# Patient Record
Sex: FEMALE | Race: WHITE | NOT HISPANIC OR LATINO
[De-identification: names, ages, dates, MRNs, and addresses within clinical notes are randomized per-mention and may not be internally consistent; named-entity substitution may affect disease eponyms.]

---

## 2020-06-23 PROBLEM — Z00.00 ENCOUNTER FOR PREVENTIVE HEALTH EXAMINATION: Status: ACTIVE | Noted: 2020-06-23

## 2020-06-29 ENCOUNTER — APPOINTMENT (OUTPATIENT)
Dept: OTOLARYNGOLOGY | Facility: CLINIC | Age: 55
End: 2020-06-29
Payer: COMMERCIAL

## 2020-06-29 VITALS
OXYGEN SATURATION: 98 % | TEMPERATURE: 98.5 F | DIASTOLIC BLOOD PRESSURE: 82 MMHG | SYSTOLIC BLOOD PRESSURE: 124 MMHG | RESPIRATION RATE: 14 BRPM | HEIGHT: 64 IN | WEIGHT: 165 LBS | HEART RATE: 92 BPM | BODY MASS INDEX: 28.17 KG/M2

## 2020-06-29 DIAGNOSIS — Z72.89 OTHER PROBLEMS RELATED TO LIFESTYLE: ICD-10-CM

## 2020-06-29 DIAGNOSIS — H93.8X2 OTHER SPECIFIED DISORDERS OF LEFT EAR: ICD-10-CM

## 2020-06-29 DIAGNOSIS — Z78.9 OTHER SPECIFIED HEALTH STATUS: ICD-10-CM

## 2020-06-29 DIAGNOSIS — Z86.39 PERSONAL HISTORY OF OTHER ENDOCRINE, NUTRITIONAL AND METABOLIC DISEASE: ICD-10-CM

## 2020-06-29 DIAGNOSIS — Z80.9 FAMILY HISTORY OF MALIGNANT NEOPLASM, UNSPECIFIED: ICD-10-CM

## 2020-06-29 DIAGNOSIS — Z82.0 FAMILY HISTORY OF EPILEPSY AND OTHER DISEASES OF THE NERVOUS SYSTEM: ICD-10-CM

## 2020-06-29 DIAGNOSIS — R26.89 OTHER ABNORMALITIES OF GAIT AND MOBILITY: ICD-10-CM

## 2020-06-29 DIAGNOSIS — Z82.49 FAMILY HISTORY OF ISCHEMIC HEART DISEASE AND OTHER DISEASES OF THE CIRCULATORY SYSTEM: ICD-10-CM

## 2020-06-29 DIAGNOSIS — Z81.8 FAMILY HISTORY OF OTHER MENTAL AND BEHAVIORAL DISORDERS: ICD-10-CM

## 2020-06-29 PROCEDURE — 95992 CANALITH REPOSITIONING PROC: CPT | Mod: RT

## 2020-06-29 PROCEDURE — 69801 INCISE INNER EAR: CPT | Mod: LT

## 2020-06-29 PROCEDURE — 92550 TYMPANOMETRY & REFLEX THRESH: CPT

## 2020-06-29 PROCEDURE — 99204 OFFICE O/P NEW MOD 45 MIN: CPT | Mod: 25

## 2020-06-29 PROCEDURE — 92557 COMPREHENSIVE HEARING TEST: CPT

## 2020-06-29 RX ORDER — ATORVASTATIN CALCIUM 10 MG/1
10 TABLET, FILM COATED ORAL
Refills: 0 | Status: ACTIVE | COMMUNITY

## 2020-06-29 NOTE — HISTORY OF PRESENT ILLNESS
[de-identified] : 55F w PMH of CAT HOOKS s/p endolymphatic shunt in , who presents with 3 weeks of vertigo and bilateral hearing loss and tinnitus. She reports she noticed sudden onset of right hearing loss and vertigo with tinnitus which she reached out to her PCP for diuretics which she started (chlorthalidone, 3 weeks ago). She explains that in 1 week her r hearing improved, but continues to have vertigo and fullness in L ear. She denies otorrhea, otalgia. No other ENT complaints. No pertinent Fh/SH.She is sure her l hearing is decreased over her baseline. We called [facility] for old  and they told us did not have it. Her vertgigo is severe and positional lasting seconds at a time. no inciting event. nonsmoker. grinds her teeth.

## 2020-06-29 NOTE — PROCEDURE
[Risk and Benefits Discussed] : The purpose, risks, discomforts, benefits and alternatives of the procedure have been explained to the patient including no treatment. [Same] : same as the Pre Op Dx. [] : Epley Maneuver [FreeTextEntry2] : left ear [FreeTextEntry1] : see subjective [FreeTextEntry4] : topical phenol [FreeTextEntry6] : 0.3cc dexamethasone injected AS IT under microscope\par r modified epley maneuver, nystagmus  resolved on  3rd iteration

## 2020-06-29 NOTE — ASSESSMENT
[FreeTextEntry1] : 55F w L MD s/p EL shunt in 2009 who presents with vertigo, [place] Hallpike + on the right, audiogram shows normal R hearing and moderate L hearing loss with patient c/o new onset decreased hearing of the left and fullness. Patient now s/p L intraTM injection and epley maneuver. likely reactivation of MD as she is sure l hearing is decreased\par \par \par Plan:\par - f/u 1 week\par - ofloxacin gtts\par - ear precauitions\par - elevate HOB 48hrs\par risks benefits and alts to po and it steroids discussed\par no htn dm pud infx butr has stress\par - Rx Medrol dosepak (he req)\par - Rx Omeprazole x 1 week\par - MRI IAC\par it dex injex given AS

## 2020-06-29 NOTE — REVIEW OF SYSTEMS
[As Noted in HPI] : as noted in HPI [Patient Intake Form Reviewed] : Patient intake form was reviewed [Negative] : Endocrine [FreeTextEntry1] : all other ROS negative

## 2020-06-29 NOTE — PHYSICAL EXAM
[] : [place]-Hallpike test is positive [Normal] : mucosa is normal [de-identified] : gait wide based [de-identified] : right side positive mildly

## 2020-06-29 NOTE — DATA REVIEWED
[de-identified] : audiogram- left moderate mhl cw otosclerossi, R normal hearing. she is sure l hearing is decreased over baseline.

## 2020-07-06 ENCOUNTER — APPOINTMENT (OUTPATIENT)
Dept: OTOLARYNGOLOGY | Facility: CLINIC | Age: 55
End: 2020-07-06
Payer: COMMERCIAL

## 2020-07-06 VITALS
SYSTOLIC BLOOD PRESSURE: 134 MMHG | TEMPERATURE: 98.6 F | DIASTOLIC BLOOD PRESSURE: 86 MMHG | OXYGEN SATURATION: 95 % | HEART RATE: 87 BPM

## 2020-07-06 DIAGNOSIS — H91.22 SUDDEN IDIOPATHIC HEARING LOSS, LEFT EAR: ICD-10-CM

## 2020-07-06 PROCEDURE — 95992 CANALITH REPOSITIONING PROC: CPT | Mod: RT

## 2020-07-06 PROCEDURE — 92557 COMPREHENSIVE HEARING TEST: CPT

## 2020-07-06 PROCEDURE — 99213 OFFICE O/P EST LOW 20 MIN: CPT | Mod: 25

## 2020-07-06 NOTE — HISTORY OF PRESENT ILLNESS
[de-identified] : 55F w/ PMH of CAT HOOKS s/p endolymphatic shunt , being seen for vertigo and hearing loss/tinnitus. She was found to have + [place] Hallpike on the right, eply performed with resolution of nystagmus. Today patient reports continued vertigo and hearing loss. She also had IT dex on l for suddenhl which has improved. she feels it is back to baseline. Tatyana cintron provide us with her old  No new complaints. No pertinent FH/SH.

## 2020-07-06 NOTE — REVIEW OF SYSTEMS
[As Noted in HPI] : as noted in HPI [Negative] : Heme/Lymph [FreeTextEntry1] : all other ROS negative

## 2020-07-06 NOTE — ASSESSMENT
[FreeTextEntry1] : 55F who returns for vertigo and hearing loss- h/o MD. Patient is declining another intratympanic injection at this time as she feels her ear is improved. Eplye performed again due to + nay hallpike with resolution upon repeat nay hallpike.\par \par Plan:\par - f/u in 1 week\par - keep HOB elevated 48hrs\par - keep ear dry (ear precautions) until follow up\par continue low sodium diet and diuretic\par

## 2020-07-06 NOTE — PHYSICAL EXAM
[] : [place]-Hallpike test is positive [Normal] : external appearance is normal [de-identified] : L TM with small perforation from prior injection 1% [de-identified] : right weakly positive

## 2020-07-06 NOTE — PROCEDURE
[Risk and Benefits Discussed] : The purpose, risks, discomforts, benefits and alternatives of the procedure have been explained to the patient including no treatment. [Same] : same as the Pre Op Dx. [] : Epley Maneuver [FreeTextEntry4] : n [FreeTextEntry1] : see subjective [FreeTextEntry6] : patient was found to have positive right nay hallpike, epley maneuvar was then performed with resolution of nystagmus upon repeat nay hallpike

## 2020-07-13 ENCOUNTER — APPOINTMENT (OUTPATIENT)
Dept: OTOLARYNGOLOGY | Facility: CLINIC | Age: 55
End: 2020-07-13
Payer: COMMERCIAL

## 2020-07-13 DIAGNOSIS — H81.02 MENIERE'S DISEASE, LEFT EAR: ICD-10-CM

## 2020-07-13 DIAGNOSIS — H81.11 BENIGN PAROXYSMAL VERTIGO, RIGHT EAR: ICD-10-CM

## 2020-07-13 DIAGNOSIS — H90.42 SENSORINEURAL HEARING LOSS, UNILATERAL, LEFT EAR, WITH UNRESTRICTED HEARING ON THE CONTRALATERAL SIDE: ICD-10-CM

## 2020-07-13 PROCEDURE — 92557 COMPREHENSIVE HEARING TEST: CPT

## 2020-07-13 PROCEDURE — 99214 OFFICE O/P EST MOD 30 MIN: CPT | Mod: 25

## 2020-07-13 PROCEDURE — 95992 CANALITH REPOSITIONING PROC: CPT | Mod: RT

## 2020-07-13 NOTE — HISTORY OF PRESENT ILLNESS
[de-identified] : wrong note for this day - pls see other note labeled 7/13/20 [FreeTextEntry1] : update 7/13/20-\par Patient returns for follow up with unchanged audiogram showing L SNHL. She reports resolved vertigo with position change but occasional disequilibrium. She denies any change in left ear hearing, tinnitus, otalgia or otorrhea.

## 2020-07-13 NOTE — PHYSICAL EXAM
[Normal] : assessment of respiratory effort is normal [de-identified] : gait steady  [de-identified] : L TM healed from prior intraTM injection [] : [place]-Hallpike test is negative [de-identified] : patient reported still some vertigo with the nay hallpike on the right, Eply performed with improvement

## 2020-07-13 NOTE — PROCEDURE
[Risk and Benefits Discussed] : The purpose, risks, discomforts, benefits and alternatives of the procedure have been explained to the patient including no treatment. [Same] : same as the Pre Op Dx. [] : Epley Maneuver [FreeTextEntry1] : see subjective [FreeTextEntry6] : with improvement of vertigo with R nay hallpike

## 2020-07-13 NOTE — PHYSICAL EXAM
[Normal] : assessment of respiratory effort is normal [de-identified] : gait steady  [de-identified] : L TM healed from prior intraTM injection [] : [place]-Hallpike test is negative [de-identified] : patient reported still some vertigo with the nay hallpike on the right, Eply performed with improvement

## 2020-07-13 NOTE — ASSESSMENT
[FreeTextEntry1] : 55F who returns for follow up of L sudden hearing loss and R BPPV, audiogram shows stable L asymmetric HL and Eply performed for continued vertigo (though no nystagmus) of the right. \par \par Plan:\par - f/u in 3 months or sooner if new or worsened symptoms\par - elevate HOB x 2 days\par

## 2020-07-13 NOTE — HISTORY OF PRESENT ILLNESS
[de-identified] : wrong note for this day - pls see other note labeled 7/13/20 [FreeTextEntry1] : update 7/13/20-\par Patient returns for follow up with unchanged audiogram showing L SNHL. She reports resolved vertigo with position change but occasional disequilibrium. She denies any change in left ear hearing, tinnitus, otalgia or otorrhea.

## 2020-07-13 NOTE — REVIEW OF SYSTEMS
[Patient Intake Form Reviewed] : Patient intake form was reviewed [As Noted in HPI] : as noted in HPI [Negative] : Heme/Lymph [FreeTextEntry1] : all other ROS negative

## 2020-08-19 ENCOUNTER — APPOINTMENT (OUTPATIENT)
Dept: OTOLARYNGOLOGY | Facility: CLINIC | Age: 55
End: 2020-08-19
Payer: COMMERCIAL

## 2020-08-19 VITALS
HEART RATE: 89 BPM | TEMPERATURE: 98.3 F | DIASTOLIC BLOOD PRESSURE: 84 MMHG | SYSTOLIC BLOOD PRESSURE: 127 MMHG | OXYGEN SATURATION: 99 %

## 2020-08-19 PROCEDURE — 99214 OFFICE O/P EST MOD 30 MIN: CPT | Mod: 25

## 2020-08-19 PROCEDURE — 92557 COMPREHENSIVE HEARING TEST: CPT

## 2020-08-19 PROCEDURE — 92550 TYMPANOMETRY & REFLEX THRESH: CPT

## 2020-08-19 PROCEDURE — 69801 INCISE INNER EAR: CPT | Mod: RT

## 2020-08-19 NOTE — PROCEDURE
[Risk and Benefits Discussed] : The purpose, risks, discomforts, benefits and alternatives of the procedure have been explained to the patient including no treatment. [Sudden Hearing Loss] : Sudden Hearing Loss [[facility]HL[facility] [] : Intratympanic Therapy [Same] : same as the Pre Op Dx. [FreeTextEntry6] : 0,.3 cc dex injected AD IT under microscope [FreeTextEntry4] : phenol

## 2020-08-19 NOTE — HISTORY OF PRESENT ILLNESS
[de-identified] : followup 54 yo woman with left meniere's disease has noticed 2 d of r aural fullness hl and tinnitus; she called with this yesterday pm and I advised her to come in. She denies vertigo. No inciting event. Feels a lot of pressure in the ear. -f.s.c Feels the hl is moderate in severity.

## 2020-08-24 ENCOUNTER — APPOINTMENT (OUTPATIENT)
Dept: OTOLARYNGOLOGY | Facility: CLINIC | Age: 55
End: 2020-08-24
Payer: COMMERCIAL

## 2020-08-24 VITALS
DIASTOLIC BLOOD PRESSURE: 77 MMHG | SYSTOLIC BLOOD PRESSURE: 112 MMHG | TEMPERATURE: 98.6 F | HEART RATE: 96 BPM | OXYGEN SATURATION: 95 %

## 2020-08-24 PROCEDURE — 92550 TYMPANOMETRY & REFLEX THRESH: CPT

## 2020-08-24 PROCEDURE — 92557 COMPREHENSIVE HEARING TEST: CPT

## 2020-08-24 PROCEDURE — 99214 OFFICE O/P EST MOD 30 MIN: CPT

## 2020-09-14 ENCOUNTER — APPOINTMENT (OUTPATIENT)
Dept: OTOLARYNGOLOGY | Facility: CLINIC | Age: 55
End: 2020-09-14
Payer: COMMERCIAL

## 2020-09-14 VITALS
SYSTOLIC BLOOD PRESSURE: 103 MMHG | HEART RATE: 87 BPM | TEMPERATURE: 98.3 F | OXYGEN SATURATION: 95 % | DIASTOLIC BLOOD PRESSURE: 74 MMHG

## 2020-09-14 PROCEDURE — 92557 COMPREHENSIVE HEARING TEST: CPT

## 2020-09-14 PROCEDURE — 99214 OFFICE O/P EST MOD 30 MIN: CPT

## 2020-09-14 NOTE — REASON FOR VISIT
[Subsequent Evaluation] : a subsequent evaluation for [FreeTextEntry2] : b meniere's disease and sudden hl

## 2020-09-14 NOTE — REASON FOR VISIT
[Subsequent Evaluation] : a subsequent evaluation for [FreeTextEntry2] : followup 56 yo woman with b meniere's and sudden r hl

## 2020-09-14 NOTE — HISTORY OF PRESENT ILLNESS
[de-identified] : followup 56 yo woman with b md and r sudden hearing loss -she reports that r ear feels back to normal. Has had no attacks on 50 mg chlorthalidone and lo sodium diet but feels lightheaded and has been found to have lower bp. Denies vertigo. Hl had been significant.

## 2020-09-14 NOTE — HISTORY OF PRESENT ILLNESS
[de-identified] : 1 wk followup 54 yo woman with r meniere's disease and sudden hl had po steroids, IT dex injx and increased diuretic to chlorthalidone 50 mg.day feels much better. fullness gone and feels her hearing has improved a lot, possibly to baseline. denies pain or drainage. Denies vertigo.

## 2020-09-15 LAB
ANION GAP SERPL CALC-SCNC: 16 MMOL/L
BUN SERPL-MCNC: 12 MG/DL
CALCIUM SERPL-MCNC: 9.6 MG/DL
CHLORIDE SERPL-SCNC: 89 MMOL/L
CO2 SERPL-SCNC: 31 MMOL/L
CREAT SERPL-MCNC: 0.74 MG/DL
GLUCOSE SERPL-MCNC: 119 MG/DL
POTASSIUM SERPL-SCNC: 3.2 MMOL/L
SODIUM SERPL-SCNC: 136 MMOL/L

## 2020-10-01 ENCOUNTER — APPOINTMENT (OUTPATIENT)
Dept: OTOLARYNGOLOGY | Facility: CLINIC | Age: 55
End: 2020-10-01
Payer: COMMERCIAL

## 2020-10-01 VITALS
SYSTOLIC BLOOD PRESSURE: 121 MMHG | OXYGEN SATURATION: 97 % | HEART RATE: 83 BPM | DIASTOLIC BLOOD PRESSURE: 86 MMHG | TEMPERATURE: 98.7 F

## 2020-10-01 PROCEDURE — 99214 OFFICE O/P EST MOD 30 MIN: CPT | Mod: 25

## 2020-10-01 PROCEDURE — 92557 COMPREHENSIVE HEARING TEST: CPT

## 2020-10-01 PROCEDURE — 69801 INCISE INNER EAR: CPT | Mod: RT

## 2020-10-01 NOTE — PROCEDURE
[Risk and Benefits Discussed] : The purpose, risks, discomforts, benefits and alternatives of the procedure have been explained to the patient including no treatment. [[facility] [Sudden Hearing Loss] : Sudden Hearing Loss [Same] : same as the Pre Op Dx. [] : Intratympanic Therapy [FreeTextEntry4] : phenol [FreeTextEntry6] : 0.3 cc dex injected AD IT under microscope

## 2020-10-01 NOTE — DATA REVIEWED
[de-identified] : r increased lf hl - looks conductive on  but audiologist confirms it was vibrotactile sensation which caused this appearance,

## 2020-10-01 NOTE — HISTORY OF PRESENT ILLNESS
[de-identified] : followup 54 yo woman with b meniere's disease and s/p els on left many years ago - she has longstanding h/o l severe snhl and is on lo sodium diet and chlorthalidone - due to low bp the med was decreased from 50 mg/day to 37.5 and her r hearing dropped again. She increased back to 50 mg/day and her hearing improved - here to check. Denies vertigo. She still feels as if her right hearing is decreased from baseline feels the loss is moderate in severity.

## 2020-10-08 ENCOUNTER — RX RENEWAL (OUTPATIENT)
Age: 55
End: 2020-10-08

## 2020-10-08 ENCOUNTER — APPOINTMENT (OUTPATIENT)
Dept: OTOLARYNGOLOGY | Facility: CLINIC | Age: 55
End: 2020-10-08
Payer: COMMERCIAL

## 2020-10-08 VITALS
DIASTOLIC BLOOD PRESSURE: 83 MMHG | OXYGEN SATURATION: 100 % | SYSTOLIC BLOOD PRESSURE: 122 MMHG | HEART RATE: 97 BPM | TEMPERATURE: 98.3 F

## 2020-10-08 PROCEDURE — 99213 OFFICE O/P EST LOW 20 MIN: CPT

## 2020-10-08 PROCEDURE — 92557 COMPREHENSIVE HEARING TEST: CPT

## 2020-10-08 NOTE — REASON FOR VISIT
[Subsequent Evaluation] : a subsequent evaluation for [FreeTextEntry2] : B/L MD with new onset R sudden HL

## 2020-10-08 NOTE — ASSESSMENT
[FreeTextEntry1] : 55F w PMH of MD, following up after R sudden HL s/p IT steroid inj with improved R hearing on audiogram. No further dizziness.\par \par Plan:\par - patient declined another IT injection as she feels her hearing is good enough now\par - continue diuretic and low sodium diet\par - f/u in 6 weeks, or sooner if return of symptoms\par - discussed option of regular IT dex injx's - she is considering

## 2020-10-08 NOTE — HISTORY OF PRESENT ILLNESS
[de-identified] : 55F w PMH of ponce/adrian HOOKS, who returns after a R sudden HL s/p R IT steroid inj and meedrol doseapack. Today's audiogram shows improved hearing from last appointment, but not absolutely back to baseline hearing. She also reports continued tinnitus. She denies any dizziness or  other ENT complaints.

## 2020-10-28 ENCOUNTER — TRANSCRIPTION ENCOUNTER (OUTPATIENT)
Age: 55
End: 2020-10-28

## 2020-11-05 ENCOUNTER — APPOINTMENT (OUTPATIENT)
Dept: OTOLARYNGOLOGY | Facility: CLINIC | Age: 55
End: 2020-11-05
Payer: COMMERCIAL

## 2020-11-05 VITALS
RESPIRATION RATE: 15 BRPM | OXYGEN SATURATION: 95 % | HEART RATE: 91 BPM | SYSTOLIC BLOOD PRESSURE: 111 MMHG | TEMPERATURE: 99 F | DIASTOLIC BLOOD PRESSURE: 77 MMHG

## 2020-11-05 PROCEDURE — 99213 OFFICE O/P EST LOW 20 MIN: CPT | Mod: 25

## 2020-11-05 PROCEDURE — 99072 ADDL SUPL MATRL&STAF TM PHE: CPT

## 2020-11-05 PROCEDURE — 92550 TYMPANOMETRY & REFLEX THRESH: CPT

## 2020-11-05 PROCEDURE — 92557 COMPREHENSIVE HEARING TEST: CPT

## 2020-11-05 PROCEDURE — 69801 INCISE INNER EAR: CPT | Mod: RT

## 2020-11-05 NOTE — HISTORY OF PRESENT ILLNESS
[de-identified] : followup 56 yo woman with h/o b meniere's l acitve over 10 yrs ago and had els, has longstanding snhl; on r, she has had fluctuating hearing - had done well after IT dexamethasone injx 5 weeks ago. She had called yesterday- had 2 d h/o r aural fullness hl and mild dizziness. Her r hearing had significant impairment and it was slightly better today but still decreased. Has been following lo sodium diet and diuretic. nonsmoker. hydrating well.

## 2020-11-05 NOTE — REASON FOR VISIT
[Subsequent Evaluation] : a subsequent evaluation for [FreeTextEntry2] : b maurice's and sudden r hl

## 2020-11-05 NOTE — PROCEDURE
[Risk and Benefits Discussed] : The purpose, risks, discomforts, benefits and alternatives of the procedure have been explained to the patient including no treatment. [facility] [Sudden Hearing Loss] : Sudden Hearing Loss [Same] : same as the Pre Op Dx. [] : Intratympanic Therapy [FreeTextEntry4] : phenol [FreeTextEntry6] : 0.3 cc dexamethasone injected AD IT under microscope

## 2020-11-12 ENCOUNTER — APPOINTMENT (OUTPATIENT)
Dept: OTOLARYNGOLOGY | Facility: CLINIC | Age: 55
End: 2020-11-12
Payer: COMMERCIAL

## 2020-11-12 VITALS
SYSTOLIC BLOOD PRESSURE: 124 MMHG | TEMPERATURE: 98.3 F | DIASTOLIC BLOOD PRESSURE: 84 MMHG | HEART RATE: 98 BPM | OXYGEN SATURATION: 98 %

## 2020-11-12 PROCEDURE — 92557 COMPREHENSIVE HEARING TEST: CPT

## 2020-11-12 PROCEDURE — 99072 ADDL SUPL MATRL&STAF TM PHE: CPT

## 2020-11-12 PROCEDURE — 99214 OFFICE O/P EST MOD 30 MIN: CPT

## 2020-11-12 NOTE — REASON FOR VISIT
[Subsequent Evaluation] : a subsequent evaluation for [FreeTextEntry2] : sudden bilateral hearing loss

## 2020-11-12 NOTE — REVIEW OF SYSTEMS
[As Noted in HPI] : as noted in HPI [Patient Intake Form Reviewed] : Patient intake form was reviewed [Negative] : Heme/Lymph [FreeTextEntry1] : all other ROS negative

## 2020-11-12 NOTE — DATA REVIEWED
[de-identified] : audio- worsening of bilateral SNHL in th elow frequencies - reviewed with pt [de-identified] : potassium- 3.0 (down from 3.2)

## 2020-11-12 NOTE — ASSESSMENT
[FreeTextEntry1] : 55F w PMH if B/L MD, who returns for follow up of sudden R>L hearing loss. Audiogram shows mild worsening of both ear SNHL.\par \par Plan:\par - recommended bilateral IT steroid inj- patient declined at this time\par - recommend Medrol dosepak\par - Rx Omeprazole- for GI PPX\par - potassium chloride- switch to 40mg QD\par - f/u in 1 week\par - referral to Dr. Keke Quintanilla for possible rituxumab \par

## 2020-11-12 NOTE — PHYSICAL EXAM
[Normal] : no rashes [de-identified] : healed R TM after IT steroid injection [de-identified] : gait steady

## 2020-11-13 ENCOUNTER — TRANSCRIPTION ENCOUNTER (OUTPATIENT)
Age: 55
End: 2020-11-13

## 2020-11-18 ENCOUNTER — NON-APPOINTMENT (OUTPATIENT)
Age: 55
End: 2020-11-18

## 2020-11-18 ENCOUNTER — APPOINTMENT (OUTPATIENT)
Dept: PHARMACY | Facility: CLINIC | Age: 55
End: 2020-11-18
Payer: COMMERCIAL

## 2020-11-18 ENCOUNTER — APPOINTMENT (OUTPATIENT)
Dept: OTOLARYNGOLOGY | Facility: CLINIC | Age: 55
End: 2020-11-18
Payer: COMMERCIAL

## 2020-11-18 VITALS
HEART RATE: 94 BPM | OXYGEN SATURATION: 97 % | DIASTOLIC BLOOD PRESSURE: 83 MMHG | TEMPERATURE: 97.5 F | SYSTOLIC BLOOD PRESSURE: 133 MMHG

## 2020-11-18 PROCEDURE — 92557 COMPREHENSIVE HEARING TEST: CPT

## 2020-11-18 PROCEDURE — 99213 OFFICE O/P EST LOW 20 MIN: CPT

## 2020-11-18 PROCEDURE — V5299A: CUSTOM | Mod: NC

## 2020-11-18 NOTE — HISTORY OF PRESENT ILLNESS
[de-identified] : followup 54 yo woman with h/o b meniere's disease which is starting to appear as aied. She is on low sodium diet and chlorthalidone and had po steroids and feels her hearing has improved. She has appt to see Dr Quintanilla after me and is here to check her ears. No vertigo at present.  She feels her l hearing loss is severe and r mild. Getting a l HA today. She feels her hearing decreased today with some stress.

## 2020-12-03 NOTE — HISTORY OF PRESENT ILLNESS
[de-identified] : 1 week followup 55F w PMH of B/L MD L>R, who returns with 2 days of sudden bilateral R>L hearing loss. She admits to worsened left tinnitus, but denies any otalgia, otorrhea, vertigo. She ate sodium inadvertently in the beginning of this week and experienced a lot of stress with the election. She denies vertigo. Had IT dex injx AD last week. Is on chlorthalidone and potassium. Had repeat level checked. She denies any other ENT complaints. 
patient declined

## 2020-12-04 ENCOUNTER — APPOINTMENT (OUTPATIENT)
Dept: OTOLARYNGOLOGY | Facility: CLINIC | Age: 55
End: 2020-12-04
Payer: COMMERCIAL

## 2020-12-04 VITALS
HEART RATE: 79 BPM | DIASTOLIC BLOOD PRESSURE: 80 MMHG | SYSTOLIC BLOOD PRESSURE: 122 MMHG | OXYGEN SATURATION: 97 % | TEMPERATURE: 97.6 F

## 2020-12-04 DIAGNOSIS — H91.23 SUDDEN IDIOPATHIC HEARING LOSS, BILATERAL: ICD-10-CM

## 2020-12-04 PROCEDURE — 99072 ADDL SUPL MATRL&STAF TM PHE: CPT

## 2020-12-04 PROCEDURE — 99214 OFFICE O/P EST MOD 30 MIN: CPT | Mod: 25

## 2020-12-04 PROCEDURE — 69801 INCISE INNER EAR: CPT | Mod: 50

## 2020-12-04 PROCEDURE — 92557 COMPREHENSIVE HEARING TEST: CPT

## 2020-12-04 NOTE — HISTORY OF PRESENT ILLNESS
[de-identified] : 56 yo woman with l meniere's x > 20 yrs with els and more recently r menieres with b progressive hl initially responsive ot lo sodium diet and chlorthalidone has become less responsive with electrolyte abnormalities. She has seen Dr Quintanilla who gave her an emergency prednisone script and began the process to get her rituximab. She had her order declined by insurance so she is appealing it, per Ms. Nick. She called this pm and said her hearing has been decreased in b ears for the past week. Denies vertigo. She feels the loss is severe AS and less AD.

## 2020-12-04 NOTE — PROCEDURE
[Risk and Benefits Discussed] : The purpose, risks, discomforts, benefits and alternatives of the procedure have been explained to the patient including no treatment. [[facility] [[facility]den Hearing Loss] : Sudden Hearing Loss [Same] : same as the Pre Op Dx. [] : Intratympanic Therapy [FreeTextEntry4] : phenol AD [FreeTextEntry6] : 0.3 cc dex injected AU IT under microscope atraumatically 20 min apart l first then wait 20 min then r then wait another 20 min

## 2020-12-04 NOTE — DATA REVIEWED
[de-identified] : b hl r lf worsened; l mid freqs worsened - reviewd with pt [de-identified] : she brought blood work - sodium 132 k 3.1 on 40 mg kcl/day and chlorthalidone, bun/creat nl esr 32

## 2020-12-07 ENCOUNTER — NON-APPOINTMENT (OUTPATIENT)
Age: 55
End: 2020-12-07

## 2020-12-07 ENCOUNTER — TRANSCRIPTION ENCOUNTER (OUTPATIENT)
Age: 55
End: 2020-12-07

## 2020-12-10 ENCOUNTER — APPOINTMENT (OUTPATIENT)
Dept: PHARMACY | Facility: CLINIC | Age: 55
End: 2020-12-10

## 2020-12-11 ENCOUNTER — APPOINTMENT (OUTPATIENT)
Dept: OTOLARYNGOLOGY | Facility: CLINIC | Age: 55
End: 2020-12-11
Payer: COMMERCIAL

## 2020-12-11 VITALS
TEMPERATURE: 99.1 F | HEART RATE: 96 BPM | OXYGEN SATURATION: 95 % | DIASTOLIC BLOOD PRESSURE: 89 MMHG | SYSTOLIC BLOOD PRESSURE: 111 MMHG

## 2020-12-11 PROCEDURE — 99072 ADDL SUPL MATRL&STAF TM PHE: CPT

## 2020-12-11 PROCEDURE — 92557 COMPREHENSIVE HEARING TEST: CPT

## 2020-12-11 PROCEDURE — 99214 OFFICE O/P EST MOD 30 MIN: CPT

## 2020-12-11 NOTE — HISTORY OF PRESENT ILLNESS
[de-identified] : followup 54 yo woman with aied - she took medrol and feels big b improvement. Has met with Dr Quintanilla regarding biologicals. Has mild loss of balance. She feels r ear is back to normal. has has sx for mo on r. Better about 5d.

## 2020-12-13 LAB
ALBUMIN SERPL ELPH-MCNC: 5.3 G/DL
ALP BLD-CCNC: 83 U/L
ALT SERPL-CCNC: 18 U/L
ANION GAP SERPL CALC-SCNC: 18 MMOL/L
AST SERPL-CCNC: 16 U/L
BILIRUB SERPL-MCNC: 0.5 MG/DL
BUN SERPL-MCNC: 13 MG/DL
CALCIUM SERPL-MCNC: 10.5 MG/DL
CHLORIDE SERPL-SCNC: 85 MMOL/L
CO2 SERPL-SCNC: 28 MMOL/L
CREAT SERPL-MCNC: 0.98 MG/DL
GLUCOSE SERPL-MCNC: 108 MG/DL
POTASSIUM SERPL-SCNC: 3.5 MMOL/L
PROT SERPL-MCNC: 8.1 G/DL
SODIUM SERPL-SCNC: 132 MMOL/L

## 2020-12-16 ENCOUNTER — APPOINTMENT (OUTPATIENT)
Dept: OTOLARYNGOLOGY | Facility: CLINIC | Age: 55
End: 2020-12-16
Payer: COMMERCIAL

## 2020-12-16 ENCOUNTER — APPOINTMENT (OUTPATIENT)
Dept: PHARMACY | Facility: CLINIC | Age: 55
End: 2020-12-16
Payer: COMMERCIAL

## 2020-12-16 PROCEDURE — 99072 ADDL SUPL MATRL&STAF TM PHE: CPT

## 2020-12-16 PROCEDURE — V5299A: CUSTOM | Mod: NC

## 2020-12-16 PROCEDURE — 92557 COMPREHENSIVE HEARING TEST: CPT

## 2020-12-24 ENCOUNTER — NON-APPOINTMENT (OUTPATIENT)
Age: 55
End: 2020-12-24

## 2020-12-24 ENCOUNTER — TRANSCRIPTION ENCOUNTER (OUTPATIENT)
Age: 55
End: 2020-12-24

## 2021-01-06 ENCOUNTER — APPOINTMENT (OUTPATIENT)
Dept: OTOLARYNGOLOGY | Facility: CLINIC | Age: 56
End: 2021-01-06
Payer: COMMERCIAL

## 2021-01-06 ENCOUNTER — NON-APPOINTMENT (OUTPATIENT)
Age: 56
End: 2021-01-06

## 2021-01-06 VITALS — OXYGEN SATURATION: 95 % | TEMPERATURE: 98.3 F | HEART RATE: 96 BPM

## 2021-01-06 PROCEDURE — 69801 INCISE INNER EAR: CPT | Mod: RT

## 2021-01-06 PROCEDURE — 92557 COMPREHENSIVE HEARING TEST: CPT

## 2021-01-06 PROCEDURE — 99214 OFFICE O/P EST MOD 30 MIN: CPT | Mod: 25

## 2021-01-06 PROCEDURE — 99072 ADDL SUPL MATRL&STAF TM PHE: CPT

## 2021-01-06 NOTE — PROCEDURE
[Risk and Benefits Discussed] : The purpose, risks, discomforts, benefits and alternatives of the procedure have been explained to the patient including no treatment. [N[facility] : [facility] [Sudden Hearing Loss] : Sudden Hearing Loss [Same] : same as the Pre Op Dx. [] : Intratympanic Therapy [FreeTextEntry6] : 0.3 cc dexamethasone  injected atraumatically thru pinhole perf

## 2021-01-06 NOTE — ASSESSMENT
[FreeTextEntry1] : b aied, seems also to respond as Meniere's\par I reviewed her  with her and showed her it was approx the same - she said she was sure she had decrease in r hearing and has much more distortion and tinnitus today\par I spoke with Dr Quintanilla and she encouraged IT injx as rituximab was rejected by her ins and she had written a letter of appeal (DIscussed with Ms Nick also)\par I offered IT dex injx AD (risks benefits and alts discussed)\par she wished to proceed\par done  dep\par floxin drops 2d\par rtc1 week with \par call sooner for any issues

## 2021-01-06 NOTE — HISTORY OF PRESENT ILLNESS
[de-identified] : 54 yo woman with aied and b snhl has had numerous b IT steroids injxs and po courses of steroids called today and said she had had good hearing for 3 weeks b and suddenly noticed today that hearing is starting to drop in b ears. By the time she got to our office she thought it was only the r ear, to a significant degree. Denies veritgo. Dr Quintanilla's office trying to get her rituximab or anakinra. No incitng event. has been following lo sodium diet and diuretic as it seems to be responsive.

## 2021-01-12 ENCOUNTER — NON-APPOINTMENT (OUTPATIENT)
Age: 56
End: 2021-01-12

## 2021-01-13 ENCOUNTER — APPOINTMENT (OUTPATIENT)
Dept: OTOLARYNGOLOGY | Facility: CLINIC | Age: 56
End: 2021-01-13
Payer: COMMERCIAL

## 2021-01-13 VITALS
TEMPERATURE: 99 F | RESPIRATION RATE: 14 BRPM | HEART RATE: 93 BPM | OXYGEN SATURATION: 98 % | SYSTOLIC BLOOD PRESSURE: 120 MMHG | DIASTOLIC BLOOD PRESSURE: 82 MMHG

## 2021-01-13 DIAGNOSIS — H61.21 IMPACTED CERUMEN, RIGHT EAR: ICD-10-CM

## 2021-01-13 DIAGNOSIS — H90.42 SENSORINEURAL HEARING LOSS, UNILATERAL, LEFT EAR, WITH UNRESTRICTED HEARING ON THE CONTRALATERAL SIDE: ICD-10-CM

## 2021-01-13 PROCEDURE — 99072 ADDL SUPL MATRL&STAF TM PHE: CPT

## 2021-01-13 PROCEDURE — 92557 COMPREHENSIVE HEARING TEST: CPT

## 2021-01-13 PROCEDURE — G0268 REMOVAL OF IMPACTED WAX MD: CPT

## 2021-01-13 PROCEDURE — 99214 OFFICE O/P EST MOD 30 MIN: CPT | Mod: 25

## 2021-01-13 PROCEDURE — 92542 POSITIONAL NYSTAGMUS TEST: CPT | Mod: 59

## 2021-01-13 PROCEDURE — 92541 SPONTANEOUS NYSTAGMUS TEST: CPT | Mod: 59

## 2021-01-13 NOTE — PHYSICAL EXAM
[de-identified] : r copious cerumen removed atraumatically with suction [Normal] : no rashes [de-identified] : gait steady but has trepidation

## 2021-01-13 NOTE — HISTORY OF PRESENT ILLNESS
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 She had intratympanic dexamethasone injx 1 week ago and called yesterday afternoon to say she had been dizzy ever since and could not lie down and put her head to the right. Today she said that she left the office fine 1 week ago and then heard a pop in her r ear that evening and had severe posnal vertigo when lying back and putting head to the right. The next am she woke up dizzy and her head was on its left side. She has had positional vertigo severe but has kept her head rigid for the past two nights and feels less dizzy when she lies back with head in midline. She feels her r hearing may be improved. She noted she thought the injection did this to her. She has not received biological meds from Dr Quintanilla.

## 2021-01-13 NOTE — DATA REVIEWED
[de-identified] : r hearing nl!; left worsse severe snhl reviewed with pt\par posnal vestibular testing nystagmus >5 deg/sec to left - reviewed with pt\par nay hallpike negative b - reviewed with pt

## 2021-01-13 NOTE — ASSESSMENT
[FreeTextEntry1] : cerumen removed\par hearing excellent on r; left worsened severe snhl\par It appears she felt vertigo from l ear (where hearing worsened also)\par I am not sure she accepts this\par I recommended v rehab- she said she knows how to do this and will do this herself\par I discussed a left IT dex injx but I am reluctant as she feels such procedure made her dizzy i r ear\par I offered po steroids but she did not want to take these either\par I explained she needs to get her biological meds- I texted Dr Quintanilla and recommended she followup with Dr Quintanilla by calling her tomorrow and followup with me in 2 weeks\par I texted Dr Quintanilla about this as well explaining she neeeds the meed and even offered to pay for it

## 2021-01-26 ENCOUNTER — RX RENEWAL (OUTPATIENT)
Age: 56
End: 2021-01-26

## 2021-01-26 ENCOUNTER — NON-APPOINTMENT (OUTPATIENT)
Age: 56
End: 2021-01-26

## 2021-02-03 ENCOUNTER — APPOINTMENT (OUTPATIENT)
Dept: PHARMACY | Facility: CLINIC | Age: 56
End: 2021-02-03
Payer: COMMERCIAL

## 2021-02-03 ENCOUNTER — APPOINTMENT (OUTPATIENT)
Dept: OTOLARYNGOLOGY | Facility: CLINIC | Age: 56
End: 2021-02-03
Payer: COMMERCIAL

## 2021-02-03 VITALS
SYSTOLIC BLOOD PRESSURE: 114 MMHG | DIASTOLIC BLOOD PRESSURE: 79 MMHG | TEMPERATURE: 98.7 F | OXYGEN SATURATION: 97 % | HEART RATE: 91 BPM

## 2021-02-03 PROCEDURE — 99072 ADDL SUPL MATRL&STAF TM PHE: CPT

## 2021-02-03 PROCEDURE — 99214 OFFICE O/P EST MOD 30 MIN: CPT

## 2021-02-03 PROCEDURE — 92557 COMPREHENSIVE HEARING TEST: CPT

## 2021-02-03 PROCEDURE — V5299A: CUSTOM | Mod: NC

## 2021-02-03 NOTE — ASSESSMENT
[FreeTextEntry1] : aied; behaves as b md\par continue lo sodium diet and diuretic\par hydrate\par last blood tests improved\par we are awaiting biological med from Dr Quintanilla\par rtc 3 mo with ; asked ot call sooner for any problems\par discussed l tm perf - would wait at least 6 mo to make sure ear dz not active before repair - explained technique and reason (injxs) to her\par dep as

## 2021-02-03 NOTE — HISTORY OF PRESENT ILLNESS
[de-identified] : followup 56 yo woman with h/o aied manifest as b meniere;ss on lo sodium diet and diuretic - she has required po and IT corticosteroids b. She feels her disease has cooled down and has had no hearing fluctuations in either ear or vertigo since last appt 1/13/2021.

## 2021-02-17 ENCOUNTER — NON-APPOINTMENT (OUTPATIENT)
Age: 56
End: 2021-02-17

## 2021-02-23 ENCOUNTER — APPOINTMENT (OUTPATIENT)
Dept: OTOLARYNGOLOGY | Facility: CLINIC | Age: 56
End: 2021-02-23
Payer: COMMERCIAL

## 2021-02-23 VITALS
TEMPERATURE: 98.2 F | SYSTOLIC BLOOD PRESSURE: 132 MMHG | OXYGEN SATURATION: 100 % | HEART RATE: 89 BPM | DIASTOLIC BLOOD PRESSURE: 84 MMHG

## 2021-02-23 PROCEDURE — 99213 OFFICE O/P EST LOW 20 MIN: CPT | Mod: 25

## 2021-02-23 PROCEDURE — 99072 ADDL SUPL MATRL&STAF TM PHE: CPT

## 2021-02-23 PROCEDURE — 69801 INCISE INNER EAR: CPT | Mod: RT

## 2021-02-23 PROCEDURE — 92557 COMPREHENSIVE HEARING TEST: CPT

## 2021-02-23 PROCEDURE — 92550 TYMPANOMETRY & REFLEX THRESH: CPT

## 2021-02-23 NOTE — DATA REVIEWED
[de-identified] : r asymm lf snhl recent significant drop- reviewed  and priors with pt; l no change

## 2021-02-23 NOTE — ASSESSMENT
[FreeTextEntry1] : r sudden hl\par discussed risks benefits and alts to IT dex\par she wished to proceed\par done\par dep\par drops 2d rtc 1 week with \par she is about to proceed with rituximab through Dr Quintanilla as well

## 2021-02-23 NOTE — PROCEDURE
[Risk and Benefits Discussed] : The purpose, risks, discomforts, benefits and alternatives of the procedure have been explained to the patient including no treatment. [[facility] [Sudden Hearing [facility]] : Sudden Hearing Loss [Same] : same as the Pre Op Dx. [] : Intratympanic Therapy [FreeTextEntry4] : phenol [FreeTextEntry6] : 0,3 cc dex injected AD IT under microscope

## 2021-02-23 NOTE — HISTORY OF PRESENT ILLNESS
[de-identified] : followup 54 yo woman with b aied and has had b sudden hl. She has noticed r hearing drops over the past few d. No inciting factor except ate some chili but home-made. She is on lo sodium diet and diuretic.She wished to see how much the drops was and whether it was worth treating. he prefers to stay off po steroids due to the fact that she is planning vaccination for covid soon.

## 2021-02-24 ENCOUNTER — RX RENEWAL (OUTPATIENT)
Age: 56
End: 2021-02-24

## 2021-03-02 ENCOUNTER — APPOINTMENT (OUTPATIENT)
Dept: OTOLARYNGOLOGY | Facility: CLINIC | Age: 56
End: 2021-03-02
Payer: COMMERCIAL

## 2021-03-02 VITALS
SYSTOLIC BLOOD PRESSURE: 113 MMHG | TEMPERATURE: 97.4 F | OXYGEN SATURATION: 98 % | HEART RATE: 82 BPM | RESPIRATION RATE: 15 BRPM | DIASTOLIC BLOOD PRESSURE: 76 MMHG

## 2021-03-02 PROCEDURE — 99072 ADDL SUPL MATRL&STAF TM PHE: CPT

## 2021-03-02 PROCEDURE — 92557 COMPREHENSIVE HEARING TEST: CPT

## 2021-03-02 PROCEDURE — 99214 OFFICE O/P EST MOD 30 MIN: CPT

## 2021-03-02 NOTE — ASSESSMENT
[FreeTextEntry1] : huge improvement of r sudden hl; l no change\par reassured\par continue lo sodium diet an diuretics as this behaves somewhat like Meniere's Disease\par I agree with Rituximab analog management plan\par rtc 6 weeks with  for test prior to med administration\par Asked to call sooner for any problems

## 2021-03-02 NOTE — HISTORY OF PRESENT ILLNESS
[de-identified] : followup 57 yo woman with aied and r sudden hl - she had IT dex injx last week and reports that her hearing has improved significantly in that ear. Left ear is unchanged. She is scheduled to get equivalent to Rituximab with Dr Quintanilla in APril, after she is immunized for COVID-19. She denies any recent hearing fluctuation or vertigo.

## 2021-03-02 NOTE — DATA REVIEWED
[de-identified] :  l no change severe mhl; r significant improvement to normal levels x hf mild loss compared to prior 's for pt

## 2021-03-18 ENCOUNTER — APPOINTMENT (OUTPATIENT)
Dept: OTOLARYNGOLOGY | Facility: CLINIC | Age: 56
End: 2021-03-18
Payer: COMMERCIAL

## 2021-03-18 ENCOUNTER — APPOINTMENT (OUTPATIENT)
Dept: OTOLARYNGOLOGY | Facility: CLINIC | Age: 56
End: 2021-03-18

## 2021-03-18 DIAGNOSIS — R42 DIZZINESS AND GIDDINESS: ICD-10-CM

## 2021-03-18 PROCEDURE — 99072 ADDL SUPL MATRL&STAF TM PHE: CPT

## 2021-03-18 PROCEDURE — 69801 INCISE INNER EAR: CPT | Mod: RT

## 2021-03-18 PROCEDURE — 99214 OFFICE O/P EST MOD 30 MIN: CPT | Mod: 25

## 2021-03-18 PROCEDURE — 92557 COMPREHENSIVE HEARING TEST: CPT

## 2021-03-18 NOTE — HISTORY OF PRESENT ILLNESS
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 She explains she is getting vaccinated for COVID over the next few weeks and will get Rituximab in late April 2021. She woke up this am and noticed that her r hearing was mildly decreased. Also minimally dizzy. No inciting event. Had a COVID injx but also had these episodes prior so difficult to know if related.

## 2021-03-18 NOTE — PHYSICAL EXAM
[de-identified] : r pinhole perf; l no change [Normal] : no rashes [de-identified] : gait steady

## 2021-03-18 NOTE — DATA REVIEWED
[de-identified] : r new low freq snhl - I reviewed this  and that of last test from 3/2/2021with pt

## 2021-03-18 NOTE — PROCEDURE
[Risk and Benefits Discussed] : The purpose, risks, discomforts, benefits and alternatives of the procedure have been explained to the patient including no treatment. [[facility] [[facility]] : Sudden Hearing Loss [Same] : same as the Pre Op Dx. [] : Intratympanic Therapy [FreeTextEntry4] : phenol [FreeTextEntry6] : 0.3 cc dexamethasone injected AD IT under microscope atraumatically

## 2021-03-18 NOTE — ASSESSMENT
[FreeTextEntry1] : r sudden hl and mild dizziness in the setting of AIED behaving as B Meniere's disease\par unsafe to give her prednisone at this point due to bein vaccinated for covid- it can inhibit her immune response\par IT injx should be fine\par discussed risks benefits and alts to IT dex injx\par wished to proceed\par done thru pinhole perf\par dep\par drops 2d (she has them)\par rtc 1 week with \par asked to call sooner for any problems

## 2021-03-22 ENCOUNTER — RX RENEWAL (OUTPATIENT)
Age: 56
End: 2021-03-22

## 2021-03-26 ENCOUNTER — APPOINTMENT (OUTPATIENT)
Dept: OTOLARYNGOLOGY | Facility: CLINIC | Age: 56
End: 2021-03-26
Payer: COMMERCIAL

## 2021-03-26 VITALS
DIASTOLIC BLOOD PRESSURE: 76 MMHG | OXYGEN SATURATION: 99 % | SYSTOLIC BLOOD PRESSURE: 111 MMHG | TEMPERATURE: 96.8 F | HEART RATE: 74 BPM

## 2021-03-26 PROCEDURE — 99072 ADDL SUPL MATRL&STAF TM PHE: CPT

## 2021-03-26 PROCEDURE — 92557 COMPREHENSIVE HEARING TEST: CPT

## 2021-03-26 PROCEDURE — 99213 OFFICE O/P EST LOW 20 MIN: CPT

## 2021-03-26 NOTE — REASON FOR VISIT
[Subsequent Evaluation] : a subsequent evaluation for [FreeTextEntry2] : hearing loss and dizziness/aied

## 2021-03-26 NOTE — HISTORY OF PRESENT ILLNESS
[de-identified] : follow up 55 yo woman with AIED - she has fluctuation of r hearing and l severe mhl - has appt scheduled for her COVID-19 vaccine and then for biological med with Dr Quintanilla. She noticed r hl and neck stiffness and dizziness yestrday. She had appt here but moved it to today and went to pt for her neck and feels all better today. She feels as if her  r hl has resolved and no more vertigo. She said the pt said it could help her hearing also.

## 2021-03-26 NOTE — ASSESSMENT
[FreeTextEntry1] : likely aied episode yesterday which resolved\par I explained pt can help vertigo sometimes but not hl\par continue lo sodium diet and diuretic and hydration \par reassured\par rtc if flares again\par has covid vaccine scheduled for next week\par rtc as scheduled in a few weeks\par repair l tm perf when stable

## 2021-04-14 ENCOUNTER — APPOINTMENT (OUTPATIENT)
Dept: PHARMACY | Facility: CLINIC | Age: 56
End: 2021-04-14
Payer: COMMERCIAL

## 2021-04-14 ENCOUNTER — APPOINTMENT (OUTPATIENT)
Dept: OTOLARYNGOLOGY | Facility: CLINIC | Age: 56
End: 2021-04-14
Payer: COMMERCIAL

## 2021-04-14 VITALS — TEMPERATURE: 97.8 F

## 2021-04-14 PROCEDURE — 92557 COMPREHENSIVE HEARING TEST: CPT

## 2021-04-14 PROCEDURE — 99213 OFFICE O/P EST LOW 20 MIN: CPT | Mod: 25

## 2021-04-14 PROCEDURE — 69801 INCISE INNER EAR: CPT | Mod: RT

## 2021-04-14 PROCEDURE — 99072 ADDL SUPL MATRL&STAF TM PHE: CPT

## 2021-04-14 NOTE — HISTORY OF PRESENT ILLNESS
[de-identified] : almost 3 week followup for this 57 yo woman with aied and l severe snhl and r fluctuating snhl. She got second covid immunization 12 d ago. Last night she noticed r hearing drops.She is on lo sodium diet an diuretic. No inciting event. Denies vertigo. Scheduled to fly to [place] tomorrow to see her father. She feels her hl is severe on the r and has some tinnitus as well.

## 2021-04-14 NOTE — ASSESSMENT
[FreeTextEntry1] : holly carlin with aied\par discussed po steroids- she wished to hold off as she had covid vaccine less than 2 wks ago - I agree; also they made her feel ill last time.\par \par I explained that I could not recommend intratympanic dex either as she is scheduled to take a flight [place] - she said she would move 4d - then ok with me\par \par many questions answered\par \par discussed risks benefits and alts to IT dex injx\par she wished to proceed\par done\par dep\par drops 2d\par rtc 1 week with ebonie

## 2021-04-14 NOTE — PROCEDURE
[Risk and Benefits Discussed] : The purpose, risks, discomforts, benefits and alternatives of the procedure have been explained to the patient including no treatment. [facility] [Sudden [facility]] : Sudden Hearing Loss [Same] : same as the Pre Op Dx. [] : Intratympanic Therapy [FreeTextEntry4] : phenol [FreeTextEntry6] : 0.3 cc dexamethasone injected AD IT atraumatically under microscope

## 2021-04-21 ENCOUNTER — RX RENEWAL (OUTPATIENT)
Age: 56
End: 2021-04-21

## 2021-04-23 ENCOUNTER — APPOINTMENT (OUTPATIENT)
Dept: PHARMACY | Facility: CLINIC | Age: 56
End: 2021-04-23
Payer: SELF-PAY

## 2021-04-23 ENCOUNTER — APPOINTMENT (OUTPATIENT)
Dept: OTOLARYNGOLOGY | Facility: CLINIC | Age: 56
End: 2021-04-23
Payer: COMMERCIAL

## 2021-04-23 ENCOUNTER — APPOINTMENT (OUTPATIENT)
Dept: PHARMACY | Facility: CLINIC | Age: 56
End: 2021-04-23
Payer: COMMERCIAL

## 2021-04-23 VITALS — BODY MASS INDEX: 29.02 KG/M2 | WEIGHT: 170 LBS | HEIGHT: 64 IN

## 2021-04-23 VITALS
SYSTOLIC BLOOD PRESSURE: 127 MMHG | HEART RATE: 98 BPM | TEMPERATURE: 97.1 F | OXYGEN SATURATION: 99 % | DIASTOLIC BLOOD PRESSURE: 89 MMHG

## 2021-04-23 PROCEDURE — 92557 COMPREHENSIVE HEARING TEST: CPT

## 2021-04-23 PROCEDURE — 99213 OFFICE O/P EST LOW 20 MIN: CPT

## 2021-04-23 PROCEDURE — V5261G: CUSTOM

## 2021-04-23 PROCEDURE — V5257D: CUSTOM | Mod: LT

## 2021-04-23 PROCEDURE — Z3600: CPT

## 2021-04-23 PROCEDURE — 99072 ADDL SUPL MATRL&STAF TM PHE: CPT

## 2021-04-23 NOTE — HISTORY OF PRESENT ILLNESS
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 She had an intratympanic dexamethasone injection in the r earfor sudden hl 8 d ago with me and visited her father in [place]. She repots her hearing improved after three days but then it worsened again beginning yesterday in the r ear. Her left ear remains unchanged with severe hearing loss. She wished to know what I suggested to do. No inciting event.

## 2021-04-23 NOTE — REASON FOR VISIT
[Subsequent Evaluation] : a subsequent evaluation for [FreeTextEntry2] : autoimmune inner ear disease

## 2021-04-23 NOTE — ASSESSMENT
[FreeTextEntry1] : aied with transient improvement and then worsening in r ear so there is no apparent change, l no change\par b significant snhl\par plan is to get rituximab next week\par I recommended IT dex injx to try to boost hearing AD\par she did not want this, even though I explained the longer the interval to improvement, the less likely it will happen\par she did not want this and told me she would get a big does of steroids with the rituximab and this will be sufficient. I could not convince her.\par \par Rtc 1 mo or as needed; she said she may wish to come in sooner- ok with me

## 2021-05-18 ENCOUNTER — NON-APPOINTMENT (OUTPATIENT)
Age: 56
End: 2021-05-18

## 2021-05-19 ENCOUNTER — APPOINTMENT (OUTPATIENT)
Dept: OTOLARYNGOLOGY | Facility: CLINIC | Age: 56
End: 2021-05-19
Payer: COMMERCIAL

## 2021-05-19 VITALS
HEART RATE: 86 BPM | SYSTOLIC BLOOD PRESSURE: 116 MMHG | DIASTOLIC BLOOD PRESSURE: 80 MMHG | OXYGEN SATURATION: 98 % | TEMPERATURE: 98.3 F

## 2021-05-19 DIAGNOSIS — H90.A22 SENSORINEURAL HEARING LOSS, UNILATERAL, LEFT EAR, WITH RESTRICTED HEARING ON THE CONTRALATERAL SIDE: ICD-10-CM

## 2021-05-19 PROCEDURE — 92557 COMPREHENSIVE HEARING TEST: CPT

## 2021-05-19 PROCEDURE — 99072 ADDL SUPL MATRL&STAF TM PHE: CPT

## 2021-05-19 PROCEDURE — 99213 OFFICE O/P EST LOW 20 MIN: CPT

## 2021-05-19 NOTE — ASSESSMENT
[FreeTextEntry1] : aied, clinically stable\par I explained she may notice some mild fluctuations but ear appears to be stable overall at this point\par continue lo sodium diet and diuretic (In case responds as Meniere's) hydrate\par rtc 3 mo (sooner if she wishes) with \par many questions answered

## 2021-05-19 NOTE — HISTORY OF PRESENT ILLNESS
[de-identified] : 1 mo followup for this 55 yo woman with aied and fluctuating r snhl with severe l hl. She thought her hearing was down in the r ear when she called yesterday so she was told to come in. She said she would if the hearing seemed to be decreased and it did today so she came in. She is here to review her . Denies vertigo. Has had Rituximab.

## 2021-06-16 ENCOUNTER — NON-APPOINTMENT (OUTPATIENT)
Age: 56
End: 2021-06-16

## 2021-06-18 ENCOUNTER — APPOINTMENT (OUTPATIENT)
Dept: OTOLARYNGOLOGY | Facility: CLINIC | Age: 56
End: 2021-06-18
Payer: COMMERCIAL

## 2021-06-18 ENCOUNTER — APPOINTMENT (OUTPATIENT)
Dept: PHARMACY | Facility: CLINIC | Age: 56
End: 2021-06-18

## 2021-06-18 VITALS
SYSTOLIC BLOOD PRESSURE: 120 MMHG | HEART RATE: 74 BPM | OXYGEN SATURATION: 98 % | DIASTOLIC BLOOD PRESSURE: 84 MMHG | TEMPERATURE: 97.4 F

## 2021-06-18 DIAGNOSIS — H91.90 UNSPECIFIED HEARING LOSS, UNSPECIFIED EAR: ICD-10-CM

## 2021-06-18 PROCEDURE — 99072 ADDL SUPL MATRL&STAF TM PHE: CPT

## 2021-06-18 PROCEDURE — 92557 COMPREHENSIVE HEARING TEST: CPT

## 2021-06-18 PROCEDURE — 99214 OFFICE O/P EST MOD 30 MIN: CPT

## 2021-06-18 NOTE — ASSESSMENT
[FreeTextEntry1] : -meniere's/autoimmune inner ear dz\par reassured her r hearing is good - we agreed she should remain off of steroids\par kcl 20 mg/day\par repeat k+ 2 weeks - asked to send me results\par rtc 3 mo and as needed

## 2021-06-18 NOTE — HISTORY OF PRESENT ILLNESS
[de-identified] : 55 yo woman with autoimmune inner ear dz and sx of meniere's on hctz-triamterene has had r fluctuating hearing all week. I recommended she start a medrol dosepack yesterday and she said today that her hearing cleared without it. She has had ritfuximab and says that prior to getting it the hl would not have cleared on its own. She also shows me a recent potassium level of 3.2. Cayetanoies demetri

## 2021-07-06 ENCOUNTER — APPOINTMENT (OUTPATIENT)
Dept: OTOLARYNGOLOGY | Facility: CLINIC | Age: 56
End: 2021-07-06
Payer: COMMERCIAL

## 2021-07-06 VITALS
OXYGEN SATURATION: 98 % | DIASTOLIC BLOOD PRESSURE: 86 MMHG | HEART RATE: 89 BPM | TEMPERATURE: 97.5 F | SYSTOLIC BLOOD PRESSURE: 117 MMHG

## 2021-07-06 PROCEDURE — 99214 OFFICE O/P EST MOD 30 MIN: CPT | Mod: 25

## 2021-07-06 PROCEDURE — 99072 ADDL SUPL MATRL&STAF TM PHE: CPT

## 2021-07-06 PROCEDURE — 69801 INCISE INNER EAR: CPT | Mod: RT

## 2021-07-06 PROCEDURE — 92557 COMPREHENSIVE HEARING TEST: CPT

## 2021-07-06 PROCEDURE — 92550 TYMPANOMETRY & REFLEX THRESH: CPT

## 2021-07-07 NOTE — PROCEDURE
[Risk and Benefits Discussed] : The purpose, risks, discomforts, benefits and alternatives of the procedure have been explained to the patient including no treatment. [[facility] [[facility]] : Sudden Hearing Loss [Same] : same as the Pre Op Dx. [] : Intratympanic Therapy [FreeTextEntry4] : phenol [FreeTextEntry6] : 0.3 cc dex injected AD IT under microscope

## 2021-07-07 NOTE — HISTORY OF PRESENT ILLNESS
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 The vertigo has improved and hl improved but she still has some r hl and fullness in the ear. She is on chlorthalidone and missed her blood draw as lab was closed but says she will get it next week. She is having menopausal sx and wonders if they could relate.

## 2021-07-07 NOTE — ASSESSMENT
[FreeTextEntry1] : exacerbation of meniere's/aied\par continue lo sodium diet and diruetic\par blood test scheduled\par discussed risks benefits alts to po and IT steroids\par she wished to proceed\par no htn dm pud infx or psych\par medrol dosepack\par omep\par It injx done\par dep\par floxin drops 2d\par rtc 1 week with \par asked to call for problems

## 2021-07-13 ENCOUNTER — APPOINTMENT (OUTPATIENT)
Dept: OTOLARYNGOLOGY | Facility: CLINIC | Age: 56
End: 2021-07-13
Payer: COMMERCIAL

## 2021-07-13 VITALS
DIASTOLIC BLOOD PRESSURE: 85 MMHG | TEMPERATURE: 98.1 F | HEART RATE: 57 BPM | SYSTOLIC BLOOD PRESSURE: 149 MMHG | OXYGEN SATURATION: 99 %

## 2021-07-13 VITALS
TEMPERATURE: 98.3 F | OXYGEN SATURATION: 96 % | HEART RATE: 92 BPM | SYSTOLIC BLOOD PRESSURE: 118 MMHG | DIASTOLIC BLOOD PRESSURE: 85 MMHG

## 2021-07-13 PROCEDURE — 69801 INCISE INNER EAR: CPT | Mod: RT

## 2021-07-13 PROCEDURE — 99214 OFFICE O/P EST MOD 30 MIN: CPT | Mod: 25

## 2021-07-13 PROCEDURE — 99072 ADDL SUPL MATRL&STAF TM PHE: CPT

## 2021-07-13 PROCEDURE — 92557 COMPREHENSIVE HEARING TEST: CPT

## 2021-07-13 NOTE — HISTORY OF PRESENT ILLNESS
[de-identified] : followup 57 yo woman with h/o autoimmune inner ear disease which has behaved as b meniere's disease who is on low sodium diet, diuretic, hydration, and had IT dexamethasone and medrol dosepack for r sudden hl reports her hearing fluctuated, often worse all week and feels back to the level it was at last week. Denies inciting event pain or drainage. She has also had l endolymphatic shunt and rituximab. no vertigo recently.

## 2021-07-13 NOTE — ASSESSMENT
[FreeTextEntry1] : r sudden hl no better with medrol dosepack and it dex in the setting of autoimmune inner ear disease\par discussed risks benefits and alts to po prednisone higher dosage and IT dex\par she wanted abbreviated course of prednisone due to trip despite possible better efficacy of longer course and did not want to postpone trip.\par prednisone ordered\par omep\par IT dex injx given\par dep\par floxin 2d\par rtc 1 week with \par cmp ordered for today\par per pt wishes I texted Dr Quintanilla to discuss - she replied she would call me back

## 2021-07-13 NOTE — PROCEDURE
[Risk and Benefits Discussed] : The purpose, risks, discomforts, benefits and alternatives of the procedure have been explained to the patient including no treatment. [facility] [[facility]] : Sudden Hearing Loss [Same] : same as the Pre Op Dx. [] : Intratympanic Therapy [FreeTextEntry4] : p [FreeTextEntry6] : 0.3 cc dex injected thru r pinhole atraumatically under microsocpe

## 2021-07-14 ENCOUNTER — NON-APPOINTMENT (OUTPATIENT)
Age: 56
End: 2021-07-14

## 2021-07-14 LAB
ALBUMIN SERPL ELPH-MCNC: 5.3 G/DL
ALP BLD-CCNC: 93 U/L
ALT SERPL-CCNC: 18 U/L
ANION GAP SERPL CALC-SCNC: 15 MMOL/L
AST SERPL-CCNC: 15 U/L
BILIRUB SERPL-MCNC: 0.3 MG/DL
BUN SERPL-MCNC: 18 MG/DL
CALCIUM SERPL-MCNC: 11.8 MG/DL
CHLORIDE SERPL-SCNC: 91 MMOL/L
CO2 SERPL-SCNC: 32 MMOL/L
CREAT SERPL-MCNC: 1.03 MG/DL
GLUCOSE SERPL-MCNC: 94 MG/DL
POTASSIUM SERPL-SCNC: 4.1 MMOL/L
PROT SERPL-MCNC: 8.2 G/DL
SODIUM SERPL-SCNC: 138 MMOL/L

## 2021-07-16 ENCOUNTER — NON-APPOINTMENT (OUTPATIENT)
Age: 56
End: 2021-07-16

## 2021-07-21 ENCOUNTER — APPOINTMENT (OUTPATIENT)
Dept: OTOLARYNGOLOGY | Facility: CLINIC | Age: 56
End: 2021-07-21
Payer: COMMERCIAL

## 2021-07-21 VITALS
HEART RATE: 93 BPM | DIASTOLIC BLOOD PRESSURE: 84 MMHG | SYSTOLIC BLOOD PRESSURE: 120 MMHG | TEMPERATURE: 97.2 F | OXYGEN SATURATION: 96 %

## 2021-07-21 PROCEDURE — 92557 COMPREHENSIVE HEARING TEST: CPT

## 2021-07-21 PROCEDURE — 99214 OFFICE O/P EST MOD 30 MIN: CPT

## 2021-07-21 PROCEDURE — 99072 ADDL SUPL MATRL&STAF TM PHE: CPT

## 2021-07-21 NOTE — PHYSICAL EXAM
[de-identified] : l pi perf; r tiny perf narrow than needle bore [Normal] : assessment of respiratory effort is normal [de-identified] : gsit steady

## 2021-07-21 NOTE — ASSESSMENT
[FreeTextEntry1] : r sudden hl with aied\par she is arranging followup with Dr Quintanilla \par i recommended another IT dex injx\par she would not consent to this\par rtc 1 mo and as needed

## 2021-07-21 NOTE — HISTORY OF PRESENT ILLNESS
[de-identified] : followup 55 yo woman with aied and r sudden hl (has severe l hl) has had po pred (she took extra doses from what I prescribed) and 2 IT dexamethasone injections. She feels her hearing is the same. She has had rtiuximab and I have spoken with Dr Quintanilla, her rheumatologist, and she recs she see her in a month to consider anakinra 1 month later

## 2021-07-21 NOTE — REVIEW OF SYSTEMS
Gerson returns at this time for follow-up regarding his history of diabetes, hyperlipidemia, hypertension as well as history of bladder cancer.  Patient reports that he is having small caliber stools and this is been going on for a while. He denies any blood per rectum. There is been no nausea or vomiting. He has not had any inappropriate weight loss or sweats, fevers, chills.  The patient has vehemently opposed colonoscopy in the past.  He does have a history of bladder cancer and is followed by Dr. Romario Mack though he is over due as far as follow-up. Last CT scan of his abdomen was more than a few years ago.  Transient positional dizziness but certainly much less than he has had in the past.  No other symptoms at the present time.    Patient Active Problem List   Diagnosis   • DIABETES MELLITUS TYPE II-UNCOMPL   • Other and unspecified hyperlipidemia   • Neoplasm of unspecified nature of bladder   • Hypertension   • Macular Degeneration   • Sciatica   • Ventral hernia   • Degenerative arthritis of thumb   • Dupuytren's contracture of left hand   • History of bladder cancer     ALLERGIES:  No Known Allergies     Medication list reviewed and correct.  Current Outpatient Prescriptions   Medication Sig   • metformin (GLUCOPHAGE) 1000 MG tablet 1 tablet daily   • ACCU-CHEK COMPACT PLUS TEST TWICE DAILY   • SAXagliptin HCl (ONGLYZA) 5 MG Tab Take 5 mg by mouth daily.   • simvastatin (ZOCOR) 40 MG tablet Take 1 tablet by mouth nightly.   • amlodipine-benazepril (LOTREL) 5-20 MG per capsule Take 1 capsule by mouth daily.   • pioglitazone (ACTOS) 30 MG tablet Take 1 tablet by mouth daily.   • Lancets (ACCU-CHEK MULTICLIX) Misc TEST TWICE DAILY   • finasteride (PROSCAR) 5 MG tablet Take 1 tablet by mouth daily.   • Multiple Vitamins-Minerals (PRESERVISION AREDS 2) Cap Take 1 capsule by mouth 2 times daily.   • fluticasone (FLONASE) 50 MCG/ACT nasal spray Spray 1 spray in each nostril daily. Indications: Hayfever   •  Glucose Blood (ACCU-CHEK COMPACT PLUS) strip 2 times daily.   • loratadine (CLARITIN) 10 MG tablet Take 1 tablet by mouth daily.   • acetaminophen (TYLENOL) 500 MG tablet Take 1 tablet by mouth nightly as needed for Pain.   • famotidine (PEPCID) 20 MG tablet Take 1 tablet by mouth nightly as needed.   • M-VIT PO TABS ONE TABLET BY MOUTH DAILY   • ASPIRIN 81 MG PO TABS one tablet by mouth every day   • tamsulosin (FLOMAX) 0.4 MG Cap TAKE 1 CAPSULE  DAILY AFTER A MEAL.   • nitroGLYcerin (NITROSTAT) 0.4 MG SL tablet Place 1 tablet under the tongue every 5 minutes as needed for Chest pain.   • oxybutynin (DITROPAN-XL) 10 MG 24 hr tablet Take 1 tablet by mouth daily.   • [DISCONTINUED] sitaGLIPtin (JANUVIA) 100 MG tablet Take 1 tablet by mouth daily.     No current facility-administered medications for this visit.         Family History   Problem Relation Age of Onset   • Cancer Father      unknown   • Cancer Mother      bladder   • Hypertension Mother    • Heart Mother      CHF      Social History     Social History   • Marital status:      Spouse name: N/A   • Number of children: N/A   • Years of education: N/A     Social History Main Topics   • Smoking status: Former Smoker     Quit date: 1/1/1990   • Smokeless tobacco: Never Used   • Alcohol use No   • Drug use: No   • Sexual activity: Not Asked     Other Topics Concern   • None     Social History Narrative        REVIEW OF SYSTEMS:  No change in appetite.  Denies fever or chills.  No sweat or significant change in fatigue.    No complaints of blurry vision, ocular redness or drainage.  No significant headache or earache.  No sore throat or glandular enlargement.   No significant rhinorrhea or sinus congestion.  Not clinically hard of hearing.  No chest pain, dyspnea or exertion.  Denies palpitations.  No noted murmurs.  Denies orthopnea.  Denies SOB, wheezing or sputum production.    No significant nausea, vomiting, diarrhea or constipation.  No significant  heartburn or abdominal pan.  No enlarged lymph glands or bleeding/bruising tendencies.  Denies rash, new skin lesions or other skin condition.  Denies dizziness, seizure disorder, numbness or tingling.  No significant change in weakness or change in memory.  Not depressed or duly anxious.  Insomnia is not a significant problem.  Denies incontinence or urine to a significant degree.  No dysuria, hematuria or urinary urgency.  Nocturia is not an issue.    PHYSICAL EXAM:  Vitals:   Visit Vitals   • /62   • Pulse 88   • Wt 93 kg   • BMI 32.11 kg/m2      General: Comfortable and no acute distress,   HENT: Normo-cepahalic, Mucosa moist, TMs are chronically retracted auditory canals are open.  Neck: Carotids are 2+ and equal without bruits.  Neck veins not engorged.  Cardiac:Normal S1 S2 + without murmurs or gallops.   Pulm/Chest: clear with good AE bilaterally  Abdomen/: Soft, NT, ND wo any masses obese no palpable mass. No obvious tenderness.  Ext: No peripheral edema in LE.       Lab Results   Component Value Date    CHOLESTEROL 162 05/05/2017    HDL 52 (L) 05/05/2017    CALCLDL 87 05/05/2017    TRIGLYCERIDE 117 05/05/2017     Lab Results   Component Value Date    GLUCOSE 177 (H) 05/08/2017    SODIUM 138 05/08/2017    POTASSIUM 4.6 05/08/2017    CHLORIDE 105 05/08/2017    BUN 12 05/08/2017    CREATININE 1.07 05/08/2017    CALCIUM 9.6 05/08/2017    CALCIUM 9.8 03/12/2012    ALBUMIN 3.2 (L) 05/08/2017    AST 21 05/08/2017    ALKPT 61 05/08/2017    GPT 22 05/08/2017    ANIONGAP 9 (L) 05/08/2017    BCRAT 11 05/08/2017    GLOB 2.5 05/08/2017    AGR 1.3 05/08/2017     Lab Results   Component Value Date    WBC 6.8 05/08/2017    RBC 4.43 (L) 05/08/2017     05/08/2017     02/28/2012    HGB 12.6 (L) 05/08/2017    HCT 38.5 (L) 05/08/2017      Lab Results   Component Value Date    HGBA1C 6.2 (H) 05/05/2017    TSH 0.943 05/11/2016       PROTEIN (URINE)   Date Value Ref Range Status   02/01/2011 NEGATIVE NEG  Final     MICROALBUMIN,UA (TTL)   Date Value Ref Range Status   02/01/2011 6.4 MG/L Final     MICROALBUMIN, UA (TTL)   Date Value Ref Range Status   11/04/2016 1.55 mg/dL Final     CREATININE, URINE (TOTAL)   Date Value Ref Range Status   11/04/2016 221.00 mg/dL Final     MICROALBUMIN/CREAT   Date Value Ref Range Status   02/01/2011 4.5 0.0 - 30.0 MG/G CREAT Final     Comment:     (NOTE)  Category              Spot collection RATIO                        (microalbumin/creat)  Normal                  <30    MG/G Creat  Microalbuminuria         MG/G Creat  Clinical Albuminuria    >300   MG/G Creat     MICROALBUMIN/CREATININE   Date Value Ref Range Status   11/04/2016 7.0 <30 mcg/mg Final     SPECIMEN TYPE   Date Value Ref Range Status   05/08/2017 URINE, CLEAN CATCH/MIDSTREAM  Final     COLOR   Date Value Ref Range Status   05/08/2017 YELLOW YELLOW Final     CLARITY   Date Value Ref Range Status   11/04/2011 CLEAR  Final     APPEARANCE   Date Value Ref Range Status   05/08/2017 CLEAR  Final     BILIRUBIN   Date Value Ref Range Status   05/08/2017 NEGATIVE NEGATIVE Final     KETONES   Date Value Ref Range Status   05/08/2017 NEGATIVE NEGATIVE mg/dL Final     BLOOD   Date Value Ref Range Status   05/08/2017 NEGATIVE NEGATIVE Final     pH   Date Value Ref Range Status   05/08/2017 5.5 5.0 - 7.0 Units Final     PROTEIN(URINE)   Date Value Ref Range Status   05/08/2017 NEGATIVE NEGATIVE mg/dL Final     NITRITE   Date Value Ref Range Status   05/08/2017 NEGATIVE NEGATIVE Final     LEUKOCYTE ESTERASE   Date Value Ref Range Status   05/08/2017 NEGATIVE NEGATIVE Final            Assessment:   1. Chronic nasal congestion , not compliant with regular use of nasal steroid and antihistamine. I told him we need to do such before going to see an ear nose and throat specialist for his chronic ear congestion.    2. Type 2 diabetes mellitus without complication, without long-term current use of insulin    3. Change in bowel  habits . Patient has refused colonoscopy. He probably should have a follow-up CT scan of his abdomen because of his bladder carcinoma history. We will attempt to do so with oral and IV contrast. This will hopefully at least give us some peripheral evaluation of his colon. I did tell me should have a colonoscopy regardless and he is reconsidering.    4. History of bladder cancer        Plan:      Take the Flonase EVERY day 2 puffs    Make sure that you are taking generic Claritin ( loratadine) EVERY day    If no improvement with the congestion in 2-23 weeks, email or call us.     Labs today , blood and urine    CT of abdomen and pelvis for follow up of the bladder cancer AND because of the change in the bowel habits.     Follow up open ended at the moment.          [Patient Intake Form Reviewed] : Patient intake form was reviewed [As Noted in HPI] : as noted in HPI [Negative] : Heme/Lymph

## 2021-07-23 ENCOUNTER — APPOINTMENT (OUTPATIENT)
Dept: PHARMACY | Facility: CLINIC | Age: 56
End: 2021-07-23
Payer: COMMERCIAL

## 2021-07-23 PROCEDURE — V5299A: CUSTOM | Mod: NC

## 2021-08-18 ENCOUNTER — APPOINTMENT (OUTPATIENT)
Dept: PHARMACY | Facility: CLINIC | Age: 56
End: 2021-08-18
Payer: COMMERCIAL

## 2021-08-18 ENCOUNTER — APPOINTMENT (OUTPATIENT)
Dept: OTOLARYNGOLOGY | Facility: CLINIC | Age: 56
End: 2021-08-18
Payer: COMMERCIAL

## 2021-08-18 VITALS
TEMPERATURE: 97 F | OXYGEN SATURATION: 97 % | HEART RATE: 82 BPM | SYSTOLIC BLOOD PRESSURE: 114 MMHG | RESPIRATION RATE: 14 BRPM | DIASTOLIC BLOOD PRESSURE: 77 MMHG

## 2021-08-18 PROCEDURE — 92557 COMPREHENSIVE HEARING TEST: CPT

## 2021-08-18 PROCEDURE — V5299A: CUSTOM | Mod: NC

## 2021-08-18 PROCEDURE — 99214 OFFICE O/P EST MOD 30 MIN: CPT

## 2021-08-18 NOTE — ASSESSMENT
[FreeTextEntry1] : aied\par clinically stable\par hearing improved but she said it had been better than this until a few d ago\par offered dex injx - she preferred to hold off\par she has followup with Dr Quintanilla\par continue lo sodium diet, hydration and diuretic\par rtc 2-3 mo and for any problems which may develop

## 2021-08-18 NOTE — HISTORY OF PRESENT ILLNESS
[de-identified] : 1 mo followup for this 57 yo woman with aied - she feels her hearing had improved after last visit and had some time off, but had worsening r hearing over the past few days in association with stress over making 2 cakes. She has had rituximab, and is on lo sodium diet and diuretic, has had po and IT steroids.

## 2021-08-24 ENCOUNTER — NON-APPOINTMENT (OUTPATIENT)
Age: 56
End: 2021-08-24

## 2021-09-08 ENCOUNTER — APPOINTMENT (OUTPATIENT)
Dept: OTOLARYNGOLOGY | Facility: CLINIC | Age: 56
End: 2021-09-08

## 2021-09-23 ENCOUNTER — RX RENEWAL (OUTPATIENT)
Age: 56
End: 2021-09-23

## 2021-09-23 ENCOUNTER — NON-APPOINTMENT (OUTPATIENT)
Age: 56
End: 2021-09-23

## 2021-11-04 ENCOUNTER — APPOINTMENT (OUTPATIENT)
Dept: OTOLARYNGOLOGY | Facility: CLINIC | Age: 56
End: 2021-11-04
Payer: COMMERCIAL

## 2021-11-04 VITALS
DIASTOLIC BLOOD PRESSURE: 85 MMHG | TEMPERATURE: 98.8 F | SYSTOLIC BLOOD PRESSURE: 125 MMHG | HEART RATE: 67 BPM | OXYGEN SATURATION: 100 %

## 2021-11-04 PROCEDURE — 99214 OFFICE O/P EST MOD 30 MIN: CPT

## 2021-11-04 PROCEDURE — 92550 TYMPANOMETRY & REFLEX THRESH: CPT

## 2021-11-04 PROCEDURE — 92557 COMPREHENSIVE HEARING TEST: CPT

## 2021-11-04 RX ORDER — MECLIZINE HYDROCHLORIDE 25 MG/1
25 TABLET ORAL 3 TIMES DAILY
Qty: 90 | Refills: 1 | Status: ACTIVE | COMMUNITY
Start: 2021-11-04 | End: 1900-01-01

## 2021-11-04 RX ORDER — ONDANSETRON 8 MG/1
8 TABLET, ORALLY DISINTEGRATING ORAL
Qty: 20 | Refills: 3 | Status: ACTIVE | COMMUNITY
Start: 2021-11-04 | End: 1900-01-01

## 2021-11-04 NOTE — HISTORY OF PRESENT ILLNESS
[de-identified] : 3 mo followup exam for this 57 yo F with aied, behaves as b Meniere's disease- she has severe l mhl and r snhl - she did not get improvement from steroids in July but then did well in August. Hearing and dizziness have been fluctuating and are not predictable hearing was poor for the past 2 d but is better today. Diuretic and electrolytes are being managed by her pmd.

## 2021-11-04 NOTE — PHYSICAL EXAM
[de-identified] : r nl; l perf no change [Normal] : assessment of respiratory effort is normal [de-identified] : gait steady

## 2021-11-04 NOTE — ASSESSMENT
[FreeTextEntry1] : aied with recent fluctuation\par discussed risks benefits and alts to po and it corticosteroids\par no htn, dm, pud, infx or psych\par ordered medrol dosepack )her req)\par I offered and recommended IT dex injx - she does not want it, knows risks of not taking it\par rtc 1 week with ebonie

## 2021-12-09 ENCOUNTER — RX RENEWAL (OUTPATIENT)
Age: 56
End: 2021-12-09

## 2021-12-10 ENCOUNTER — RX RENEWAL (OUTPATIENT)
Age: 56
End: 2021-12-10

## 2021-12-13 ENCOUNTER — RX RENEWAL (OUTPATIENT)
Age: 56
End: 2021-12-13

## 2021-12-14 ENCOUNTER — NON-APPOINTMENT (OUTPATIENT)
Age: 56
End: 2021-12-14

## 2021-12-16 ENCOUNTER — APPOINTMENT (OUTPATIENT)
Dept: OTOLARYNGOLOGY | Facility: CLINIC | Age: 56
End: 2021-12-16
Payer: COMMERCIAL

## 2021-12-16 VITALS
DIASTOLIC BLOOD PRESSURE: 81 MMHG | HEART RATE: 74 BPM | OXYGEN SATURATION: 98 % | SYSTOLIC BLOOD PRESSURE: 126 MMHG | TEMPERATURE: 98.9 F

## 2021-12-16 DIAGNOSIS — H81.03 MENIERE'S DISEASE, BILATERAL: ICD-10-CM

## 2021-12-16 DIAGNOSIS — H72.02 CENTRAL PERFORATION OF TYMPANIC MEMBRANE, LEFT EAR: ICD-10-CM

## 2021-12-16 DIAGNOSIS — H61.21 IMPACTED CERUMEN, RIGHT EAR: ICD-10-CM

## 2021-12-16 PROCEDURE — 92550 TYMPANOMETRY & REFLEX THRESH: CPT

## 2021-12-16 PROCEDURE — 99214 OFFICE O/P EST MOD 30 MIN: CPT | Mod: 25

## 2021-12-16 PROCEDURE — 92557 COMPREHENSIVE HEARING TEST: CPT

## 2021-12-16 PROCEDURE — 69210 REMOVE IMPACTED EAR WAX UNI: CPT

## 2021-12-16 PROCEDURE — G0268 REMOVAL OF IMPACTED WAX MD: CPT

## 2021-12-16 NOTE — ASSESSMENT
[FreeTextEntry1] : 1. aied\par -pt mentions neck PT stretches help relieve ear pain, rec pt continues and explained this could be d/t TMJ \par -HAE to adjust\par -Continue diuretic- prescribed to hold her for now, rec PCP manage diuretic and electrolytes, I am unable to reach and pt is unable to reach so rec nephrologist Dr. Carrillo for pt to followup with; alternatively Dr Alatorre or Dr Rojas\par 2. L TM perf\par -continue to monitor\par 3. R TM perf\par -dep\par -continue to monitor\par -observe for next 1-2 years >> consider tympanoplasty if remains without need for IT injx\par RTC in 6 weeks or sooner as needed - advised to set up appt Dr Carrillo same d if possible\par

## 2021-12-16 NOTE — HISTORY OF PRESENT ILLNESS
[de-identified] : 1.5 month followup 55 y/o F with h/o aied which behaves similar to b Meniere's dz. Since her last visit she has had a few episodes of disequilibrium but denies vertigo. She states that her hearing has been slightly decreased and notices she has to turn up her HA when watching TV. At last visit she was prescribed medrol dose pack for fluctuations in hearing, but by the time it was available for pickup pt states hearing felt better so she held off on taking. Also mentions neck/shoulder feels tight so she does PT stretches. She states this helps relieve pressure off ears. She has had rtiuximab.

## 2021-12-16 NOTE — PHYSICAL EXAM
[Normal] : no rashes [de-identified] :  L perf no change, significant removed in R ear atraumatically with alligator forceps, R central perf 50% clean dry and intact, mature [de-identified] : gait steady

## 2021-12-16 NOTE — DATA REVIEWED
[de-identified] :  showed 10 db decrease b since last hearing test, results reviewed with pt, in addn to increased ab gap

## 2021-12-16 NOTE — PROCEDURE
[Cerumen Impaction] : Cerumen Impaction [Same] : same as the Pre Op Dx. [] : Removal of Cerumen [FreeTextEntry5] : R copious cerumen removed atraumatically with alligator forceps

## 2021-12-16 NOTE — REASON FOR VISIT
[Subsequent Evaluation] : a subsequent evaluation for [FreeTextEntry2] : 1 month followup autoimmune inner ear dz

## 2022-01-10 ENCOUNTER — RX RENEWAL (OUTPATIENT)
Age: 57
End: 2022-01-10

## 2022-01-27 ENCOUNTER — APPOINTMENT (OUTPATIENT)
Dept: PHARMACY | Facility: CLINIC | Age: 57
End: 2022-01-27
Payer: SELF-PAY

## 2022-01-27 ENCOUNTER — RX RENEWAL (OUTPATIENT)
Age: 57
End: 2022-01-27

## 2022-01-27 ENCOUNTER — APPOINTMENT (OUTPATIENT)
Dept: OTOLARYNGOLOGY | Facility: CLINIC | Age: 57
End: 2022-01-27
Payer: COMMERCIAL

## 2022-01-27 ENCOUNTER — APPOINTMENT (OUTPATIENT)
Dept: NEPHROLOGY | Facility: CLINIC | Age: 57
End: 2022-01-27
Payer: COMMERCIAL

## 2022-01-27 VITALS — SYSTOLIC BLOOD PRESSURE: 120 MMHG | DIASTOLIC BLOOD PRESSURE: 72 MMHG | HEART RATE: 66 BPM

## 2022-01-27 PROCEDURE — 92557 COMPREHENSIVE HEARING TEST: CPT

## 2022-01-27 PROCEDURE — 99214 OFFICE O/P EST MOD 30 MIN: CPT | Mod: 25

## 2022-01-27 PROCEDURE — V5257D: CUSTOM | Mod: RT

## 2022-01-27 PROCEDURE — 36415 COLL VENOUS BLD VENIPUNCTURE: CPT

## 2022-01-27 PROCEDURE — 69801 INCISE INNER EAR: CPT | Mod: RT

## 2022-01-27 PROCEDURE — 92550 TYMPANOMETRY & REFLEX THRESH: CPT

## 2022-01-27 PROCEDURE — 99204 OFFICE O/P NEW MOD 45 MIN: CPT | Mod: 25

## 2022-01-27 RX ORDER — TRIAMTERENE AND HYDROCHLOROTHIAZIDE 50; 75 MG/1; MG/1
75-50 TABLET ORAL DAILY
Qty: 21 | Refills: 0 | Status: DISCONTINUED | COMMUNITY
Start: 2021-12-16 | End: 2022-01-27

## 2022-01-27 RX ORDER — CHLORTHALIDONE 50 MG/1
TABLET ORAL
Refills: 0 | Status: DISCONTINUED | COMMUNITY
End: 2022-01-27

## 2022-01-27 RX ORDER — POTASSIUM CHLORIDE 1500 MG/1
20 TABLET, FILM COATED, EXTENDED RELEASE ORAL
Qty: 90 | Refills: 0 | Status: DISCONTINUED | COMMUNITY
Start: 2021-06-18 | End: 2022-01-27

## 2022-01-27 RX ORDER — CHLORTHALIDONE 25 MG/1
25 TABLET ORAL
Qty: 45 | Refills: 0 | Status: DISCONTINUED | COMMUNITY
Start: 2020-09-14 | End: 2022-01-27

## 2022-01-27 RX ORDER — CHLORTHALIDONE 25 MG/1
25 TABLET ORAL
Qty: 60 | Refills: 1 | Status: DISCONTINUED | COMMUNITY
Start: 2020-10-28 | End: 2022-01-27

## 2022-01-27 RX ORDER — TRIAMTERENE AND HYDROCHLOROTHIAZIDE 50; 75 MG/1; MG/1
75-50 TABLET ORAL
Qty: 30 | Refills: 0 | Status: DISCONTINUED | COMMUNITY
Start: 2020-12-04 | End: 2022-01-27

## 2022-01-27 RX ORDER — CHLORTHALIDONE 50 MG/1
50 TABLET ORAL DAILY
Qty: 30 | Refills: 3 | Status: DISCONTINUED | COMMUNITY
Start: 2020-08-19 | End: 2022-01-27

## 2022-01-27 RX ORDER — MAGNESIUM OXIDE 500 MG
500 TABLET ORAL
Qty: 100 | Refills: 3 | Status: ACTIVE | COMMUNITY
Start: 2022-01-27 | End: 1900-01-01

## 2022-01-27 RX ORDER — TRIAMTERENE AND HYDROCHLOROTHIAZIDE 50; 75 MG/1; MG/1
75-50 TABLET ORAL
Qty: 90 | Refills: 0 | Status: DISCONTINUED | COMMUNITY
Start: 2021-09-23 | End: 2022-01-27

## 2022-01-27 NOTE — ASSESSMENT
[FreeTextEntry1] : r sudden hl with aied/Meniere's\par discussed risks benefits and alts to IT dex injx AD - she wished to proceed\par done\par dep\par drops 2d \par rtc 1 week with \par continue lo sodium diet and diuretic (per Dr Carrillo>>furosemide)

## 2022-01-27 NOTE — REASON FOR VISIT
[Subsequent Evaluation] : a subsequent evaluation for [FreeTextEntry2] : followup autoimmune inner ear disease

## 2022-01-27 NOTE — PHYSICAL EXAM
[Normal] : assessment of respiratory effort is normal [de-identified] : l small central tm perf r 50% posterior perf [de-identified] : gait steady

## 2022-01-27 NOTE — PROCEDURE
[Risk and Benefits Discussed] : The purpose, risks, discomforts, benefits and alternatives of the procedure have been explained to the patient including no treatment. [facility] [Sudden Hearing Loss] : Sudden Hearing Loss [Same] : same as the Pre Op Dx. [] : Intratympanic Therapy [FreeTextEntry6] : 0.3 cc dex injected atraumatically AD IT thru perf

## 2022-01-27 NOTE — HISTORY OF PRESENT ILLNESS
[de-identified] : 6 week followup exam for this 55 yo F with aied behaving as b Meniere's - she has had rituximab and reports she still has no B-cells. Saw Dr Carrillo for diuretic mgmt and was ordered some meds in case contracts COVID-19. She feels her hearing has decreased in the r ear as of this AM. Denies vertigo has tinnitus.Has known b tm perfs.

## 2022-01-28 ENCOUNTER — NON-APPOINTMENT (OUTPATIENT)
Age: 57
End: 2022-01-28

## 2022-02-02 LAB
25(OH)D3 SERPL-MCNC: 41.2 NG/ML
ALBUMIN MFR SERPL ELPH: 62.1 %
ALBUMIN SERPL ELPH-MCNC: 4.9 G/DL
ALBUMIN SERPL-MCNC: 4.8 G/DL
ALBUMIN/GLOB SERPL: 1.7 RATIO
ALBUPE: 26.2 %
ALP BLD-CCNC: 87 U/L
ALPHA1 GLOB MFR SERPL ELPH: 4.2 %
ALPHA1 GLOB SERPL ELPH-MCNC: 0.3 G/DL
ALPHA1UPE: 38.5 %
ALPHA2 GLOB MFR SERPL ELPH: 10.7 %
ALPHA2 GLOB SERPL ELPH-MCNC: 0.8 G/DL
ALPHA2UPE: 17.9 %
ALT SERPL-CCNC: 22 U/L
ANION GAP SERPL CALC-SCNC: 15 MMOL/L
AST SERPL-CCNC: 21 U/L
B-GLOBULIN MFR SERPL ELPH: 10.8 %
B-GLOBULIN SERPL ELPH-MCNC: 0.8 G/DL
BASOPHILS # BLD AUTO: 0.04 K/UL
BASOPHILS NFR BLD AUTO: 0.7 %
BETAUPE: 8.7 %
BILIRUB SERPL-MCNC: 0.2 MG/DL
BUN SERPL-MCNC: 19 MG/DL
CA-I SERPL-SCNC: 1.31 MMOL/L
CALCIUM SERPL-MCNC: 11.1 MG/DL
CALCIUM SERPL-MCNC: 11.1 MG/DL
CHLORIDE SERPL-SCNC: 98 MMOL/L
CO2 SERPL-SCNC: 28 MMOL/L
CREAT SERPL-MCNC: 1.12 MG/DL
DEPRECATED KAPPA LC FREE/LAMBDA SER: 1.28 RATIO
EOSINOPHIL # BLD AUTO: 0.08 K/UL
EOSINOPHIL NFR BLD AUTO: 1.3 %
GAMMA GLOB FLD ELPH-MCNC: 0.9 G/DL
GAMMA GLOB MFR SERPL ELPH: 12.2 %
GAMMAUPE: 8.7 %
GLUCOSE SERPL-MCNC: 110 MG/DL
HCT VFR BLD CALC: 40.6 %
HGB BLD-MCNC: 12.9 G/DL
IGA 24H UR QL IFE: NORMAL
IGA SER QL IEP: 81 MG/DL
IGG SER QL IEP: 936 MG/DL
IGM SER QL IEP: 41 MG/DL
IMM GRANULOCYTES NFR BLD AUTO: 0.2 %
INTERPRETATION SERPL IEP-IMP: NORMAL
KAPPA LC 24H UR QL: NORMAL
KAPPA LC CSF-MCNC: 1.24 MG/DL
KAPPA LC SERPL-MCNC: 1.59 MG/DL
LYMPHOCYTES # BLD AUTO: 1.3 K/UL
LYMPHOCYTES NFR BLD AUTO: 21.6 %
M PROTEIN SPEC IFE-MCNC: NORMAL
MAGNESIUM SERPL-MCNC: 2.1 MG/DL
MAN DIFF?: NORMAL
MCHC RBC-ENTMCNC: 30.7 PG
MCHC RBC-ENTMCNC: 31.8 GM/DL
MCV RBC AUTO: 96.7 FL
MONOCYTES # BLD AUTO: 0.51 K/UL
MONOCYTES NFR BLD AUTO: 8.5 %
NEUTROPHILS # BLD AUTO: 4.08 K/UL
NEUTROPHILS NFR BLD AUTO: 67.7 %
PARATHYROID HORMONE INTACT: 18 PG/ML
PLATELET # BLD AUTO: 313 K/UL
POTASSIUM SERPL-SCNC: 4.2 MMOL/L
PROT PATTERN 24H UR ELPH-IMP: NORMAL
PROT SERPL-MCNC: 7.7 G/DL
PROT UR-MCNC: 8 MG/DL
PROT UR-MCNC: 8 MG/DL
RBC # BLD: 4.2 M/UL
RBC # FLD: 13.5 %
SODIUM SERPL-SCNC: 140 MMOL/L
WBC # FLD AUTO: 6.02 K/UL

## 2022-02-02 NOTE — ASSESSMENT
[FreeTextEntry1] : # Borderline hypercalcemia likeliest primarily due to chlorthalidone.\par * Check labs, including ionized calcium and PTH.\par * Stop chlorthalidone. Start torsemide 10 mg daily. (Discussed with Dr. Conti, who confirms that this is a good substitute.)  Benefits, alternatives, and risks to medication including but not limited to low blood pressure, electrolyte problems discussed and they consent. UpToDate patient information on medication provided. All their questions were answered. \par * Stop vitamin D. \par * Follow up in 1 month.\par \par # Borderline hypokalemia.\par * C/w effect of diuretic.\par * Reduce k-dur to 20. \par * Recheck K, mg.\par \par # Borderline HTN.\par * Follow on furosemide. \par * The patient's blood pressure was checked with the [employer] [id number]-[id number] using the SPRINT trial protocol after sitting quietly in an empty room with arm supported, back supported, and feet on the floor for 5 minutes. The average of 3 readings were taken.\par * A counseling information sheet has been given (today or previously). All their questions were answered.\par * The patient has been counseled to check their BP at home with an automatic arm cuff, write down the readings, and reach me directly on the phone immediately if they are persistently > 180 systolic or if SBP is less than 100 or if lightheadedness develops. They were counseled to bring in all blood pressure readings and medications next visit.\par * The patient has been counseled that regular office followup (at least every 4 months for now)  is important for monitoring and for their health, and that it is their responsibility to make follow up appointments.\par * The patient also has been counseled that they must never stop or change any medications without discussing this with me (or another physician). \par \par # Immunocompromised status.\par * Discussed evusheld.\par

## 2022-02-02 NOTE — HISTORY OF PRESENT ILLNESS
[FreeTextEntry1] : Received Covid vaccine and booster. Reportedly vaccine antibody level remains low.  Vaccination  Covid March 12, Apri1 12, 3rd dose Moderna January 5th. \par \par Labs from 11/21 reviewed. K 3.7, creatinine 1.0, calcium 10.4, alb 5.1. Calcium was 11.8 on 13Jul21 with Dr. Conti. She is on vitamin D but not calcium supplement. 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 This was switched to dyazide 75/50 due to hypkalemia. \par \par She received rituxan in May 2021 and reportedly has low B cell count (0 percent CD20) and is still immunocompromised. Her rheumatologist has been unsuccessful in getting her Evusheld. \par \par K supplemented with KCl 40 meq daily. \par \par Borderline HTN, SBP 120s with Dr. Conti. No lightheadedness. \par

## 2022-02-03 ENCOUNTER — APPOINTMENT (OUTPATIENT)
Dept: OTOLARYNGOLOGY | Facility: CLINIC | Age: 57
End: 2022-02-03
Payer: COMMERCIAL

## 2022-02-03 ENCOUNTER — APPOINTMENT (OUTPATIENT)
Dept: PHARMACY | Facility: CLINIC | Age: 57
End: 2022-02-03
Payer: COMMERCIAL

## 2022-02-03 VITALS
TEMPERATURE: 98.3 F | DIASTOLIC BLOOD PRESSURE: 78 MMHG | HEART RATE: 73 BPM | SYSTOLIC BLOOD PRESSURE: 115 MMHG | OXYGEN SATURATION: 98 %

## 2022-02-03 PROCEDURE — 99213 OFFICE O/P EST LOW 20 MIN: CPT

## 2022-02-03 PROCEDURE — 92557 COMPREHENSIVE HEARING TEST: CPT

## 2022-02-03 PROCEDURE — V5299A: CUSTOM | Mod: NC

## 2022-02-03 NOTE — ASSESSMENT
[FreeTextEntry1] : B AIDE/freatures of Meniere's hearing improved\par reassured\par dep\par continue torsemide - recommend hydration and call for problems\par continue lo sodium diet\par rtc 3 mo, sooner for problems

## 2022-02-03 NOTE — PHYSICAL EXAM
[de-identified] : r central perf; l not visible but behaves like perf [Normal] : assessment of respiratory effort is normal [de-identified] : gait steady

## 2022-02-03 NOTE — HISTORY OF PRESENT ILLNESS
[de-identified] : 1 wk followup 57 yo f with b AIED with apparent hydropic behavior. She had r IT dex injx last week for sudden hl and was switched to torsemide and feels better. feels slightly dry from the torsemide. She has had rituximab as well and po steroids in the past.

## 2022-02-12 ENCOUNTER — OUTPATIENT (OUTPATIENT)
Dept: OUTPATIENT SERVICES | Facility: HOSPITAL | Age: 57
LOS: 1 days | End: 2022-02-12

## 2022-02-12 ENCOUNTER — APPOINTMENT (OUTPATIENT)
Age: 57
End: 2022-02-12

## 2022-02-12 VITALS
TEMPERATURE: 98 F | RESPIRATION RATE: 16 BRPM | OXYGEN SATURATION: 98 % | SYSTOLIC BLOOD PRESSURE: 115 MMHG | HEART RATE: 66 BPM | DIASTOLIC BLOOD PRESSURE: 77 MMHG

## 2022-02-12 VITALS
SYSTOLIC BLOOD PRESSURE: 120 MMHG | HEART RATE: 76 BPM | OXYGEN SATURATION: 99 % | TEMPERATURE: 98 F | DIASTOLIC BLOOD PRESSURE: 80 MMHG | RESPIRATION RATE: 16 BRPM

## 2022-02-12 DIAGNOSIS — Z92.25 PERSONAL HISTORY OF IMMUNOSUPPRESSION THERAPY: ICD-10-CM

## 2022-02-28 ENCOUNTER — APPOINTMENT (OUTPATIENT)
Dept: NEPHROLOGY | Facility: CLINIC | Age: 57
End: 2022-02-28
Payer: COMMERCIAL

## 2022-02-28 PROCEDURE — 99214 OFFICE O/P EST MOD 30 MIN: CPT | Mod: 95

## 2022-02-28 NOTE — HISTORY OF PRESENT ILLNESS
[Home] : at home, [unfilled] , at the time of the visit. [Medical Office: ([facility])___] : at the medical office located in  [Verbal consent obtained from patient] : the patient, [unfilled] [FreeTextEntry1] : Received Covid vaccine and booster. Reportedly vaccine antibody level remains low.  Vaccination  Covid March 12, Apri1 12, 3rd dose Moderna January 5th. \par \par * Meniere's disease improved on torsemide. No lightheadedness.  - 120s. * High normal ionized calcium. PTH 18. * Potasium increased to 4.2. On 20 meq qd. \par \par Previous history (27Jan22): Labs from 11/21 reviewed. K 3.7, creatinine 1.0, calcium 10.4, alb 5.1. Calcium was 11.8 on 13Jul21 with Dr. Conti. She is on vitamin D but not calcium supplement. 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 This was switched to dyazide 75/50 due to hypkalemia. \par \par She received rituxan in May 2021 and reportedly has low B cell count (0 percent CD20) and is still immunocompromised. Her rheumatologist has been unsuccessful in getting her Evusheld. \par \par K supplemented with KCl 40 meq daily. \par \par Borderline HTN, SBP 120s with Dr. Conti. No lightheadedness. \par

## 2022-02-28 NOTE — ASSESSMENT
[FreeTextEntry1] : # Borderline hypercalcemia likeliest due to chlorthalidone.\par * Check calcium.\par * Cont chlorthaldone.\par \par # Borderline hypokalemia improved.\par * Recheck labs.\par \par # Borderline HTN.\par * Cont torsemide.\par \par # Meniere's.\par * Cont torsemide. \par \par Follow up in 2 - 3 months. 
no

## 2022-03-26 ENCOUNTER — OUTPATIENT (OUTPATIENT)
Dept: OUTPATIENT SERVICES | Facility: HOSPITAL | Age: 57
LOS: 1 days | End: 2022-03-26

## 2022-03-26 ENCOUNTER — APPOINTMENT (OUTPATIENT)
Age: 57
End: 2022-03-26

## 2022-03-26 VITALS
RESPIRATION RATE: 18 BRPM | TEMPERATURE: 98 F | SYSTOLIC BLOOD PRESSURE: 109 MMHG | HEART RATE: 60 BPM | OXYGEN SATURATION: 98 % | DIASTOLIC BLOOD PRESSURE: 74 MMHG

## 2022-03-26 VITALS — HEART RATE: 71 BPM | DIASTOLIC BLOOD PRESSURE: 71 MMHG | SYSTOLIC BLOOD PRESSURE: 105 MMHG | TEMPERATURE: 98 F

## 2022-03-26 DIAGNOSIS — Z23 ENCOUNTER FOR IMMUNIZATION: ICD-10-CM

## 2022-03-26 DIAGNOSIS — Z92.25 PERSONAL HISTORY OF IMMUNOSUPPRESSION THERAPY: ICD-10-CM

## 2022-04-04 ENCOUNTER — TRANSCRIPTION ENCOUNTER (OUTPATIENT)
Age: 57
End: 2022-04-04

## 2022-04-04 RX ORDER — POTASSIUM CHLORIDE 1500 MG/1
20 TABLET, FILM COATED, EXTENDED RELEASE ORAL DAILY
Qty: 60 | Refills: 3 | Status: DISCONTINUED | COMMUNITY
Start: 2020-11-12 | End: 2022-04-04

## 2022-04-18 ENCOUNTER — TRANSCRIPTION ENCOUNTER (OUTPATIENT)
Age: 57
End: 2022-04-18

## 2022-05-03 ENCOUNTER — APPOINTMENT (OUTPATIENT)
Dept: OTOLARYNGOLOGY | Facility: CLINIC | Age: 57
End: 2022-05-03
Payer: COMMERCIAL

## 2022-05-03 VITALS
RESPIRATION RATE: 16 BRPM | TEMPERATURE: 97.3 F | SYSTOLIC BLOOD PRESSURE: 106 MMHG | HEIGHT: 64 IN | WEIGHT: 158 LBS | HEART RATE: 67 BPM | DIASTOLIC BLOOD PRESSURE: 71 MMHG | OXYGEN SATURATION: 99 % | BODY MASS INDEX: 26.98 KG/M2

## 2022-05-03 PROCEDURE — 99214 OFFICE O/P EST MOD 30 MIN: CPT

## 2022-05-03 PROCEDURE — 92550 TYMPANOMETRY & REFLEX THRESH: CPT

## 2022-05-03 PROCEDURE — 92557 COMPREHENSIVE HEARING TEST: CPT

## 2022-05-03 NOTE — PHYSICAL EXAM
[de-identified] : b scant cerumen removed atraumatically with curette b TM perfs clean, dry, intact [Normal] : no nystagmus [de-identified] : gait steady

## 2022-05-03 NOTE — PROCEDURE
[Cerumen Impaction] : Cerumen Impaction [Same] : same as the Pre Op Dx. [] : Removal of Cerumen [FreeTextEntry5] : b scant cerumen removed atraumatically with suction, b TM perfs clean, dry, intact

## 2022-05-03 NOTE — ASSESSMENT
[FreeTextEntry1] : 1. b aied/ features of Meniere's hearing\par -improved\par - showed b improvement- reviewed with pt\par -continue torsemide\par -electrolytes normalized- pt is following with Dr. Carrillo; minimally elevated glc but ate before test\par -rec she stays well hydrated\par -continue lo sodium diet\par 2. b cerumen impaction \par -cerumen removed\par -ears feel better\par -Avoid Qtip usage\par 3. b TM perforations\par -continue dep\par -continue to observe \par she asked about reapir of left - I recommended waiting another 6 mo to ensure r stability as can get sodium bolus in o.r - she agreed\par RTC 3 months and sooner as needed

## 2022-05-03 NOTE — HISTORY OF PRESENT ILLNESS
[de-identified] : 3 month followup for this 58 y/o F with b AIED and apparent hydropic behavior. She feels hearing has improved since last visit. She is using TV volume to measure hearing at home. She is on torsemide, staying well hydrated, and following the lo sodium diet. She has not had any episodes of dizziness since July, and the last time she felt dizzy was in December.

## 2022-05-04 ENCOUNTER — TRANSCRIPTION ENCOUNTER (OUTPATIENT)
Age: 57
End: 2022-05-04

## 2022-09-06 ENCOUNTER — APPOINTMENT (OUTPATIENT)
Dept: OTOLARYNGOLOGY | Facility: CLINIC | Age: 57
End: 2022-09-06

## 2022-09-06 VITALS
WEIGHT: 159 LBS | BODY MASS INDEX: 27.14 KG/M2 | DIASTOLIC BLOOD PRESSURE: 69 MMHG | SYSTOLIC BLOOD PRESSURE: 106 MMHG | RESPIRATION RATE: 16 BRPM | TEMPERATURE: 98.3 F | HEART RATE: 73 BPM | OXYGEN SATURATION: 97 % | HEIGHT: 64 IN

## 2022-09-06 PROCEDURE — 92567 TYMPANOMETRY: CPT

## 2022-09-06 PROCEDURE — 99214 OFFICE O/P EST MOD 30 MIN: CPT

## 2022-09-06 PROCEDURE — 92557 COMPREHENSIVE HEARING TEST: CPT

## 2022-09-06 NOTE — ASSESSMENT
[FreeTextEntry1] : b aied, behaving as Meniere's also - stable\par continue low sodium diet, hydration and torsemide\par rtc 6 mo with  - asked to call or come in sooner for any issues which may develop\par discussed l tympanoplasty- she prefers to wait on this for now

## 2022-09-06 NOTE — PHYSICAL EXAM
[de-identified] : b tm perfs clean and dry, no change [Normal] : assessment of respiratory effort is normal [de-identified] : gait steady

## 2022-09-06 NOTE — HISTORY OF PRESENT ILLNESS
[de-identified] : 3 mo followup visit for this 58 yo F with h/o B aied also with sx of B meniere's . She has been treated with lo sodium diet, diuretic, l els, courses of PO and IT steroids and rituximab. She currently has had no fluctuations of hearing, tinnitus or vertigo since last visit- last vertigo was in 12.2021. She has known b tm perfs. Stable on lo sodium diet and torsemide, hydration.

## 2022-09-06 NOTE — DATA REVIEWED
[de-identified] : r snhl mild minimal ab gap, l mhl no change from 5/2022 reviewed with pt [de-identified] : sma7 ok minimal elevated bun 7/2022 - reviewed with pt in her Iphone - I asked her to send to asst so it could be scanned into emr - she agreed.

## 2022-11-14 ENCOUNTER — NON-APPOINTMENT (OUTPATIENT)
Age: 57
End: 2022-11-14

## 2022-11-18 ENCOUNTER — APPOINTMENT (OUTPATIENT)
Dept: OTOLARYNGOLOGY | Facility: CLINIC | Age: 57
End: 2022-11-18

## 2022-11-18 VITALS
DIASTOLIC BLOOD PRESSURE: 83 MMHG | OXYGEN SATURATION: 98 % | RESPIRATION RATE: 16 BRPM | HEART RATE: 77 BPM | TEMPERATURE: 97.2 F | HEIGHT: 64 IN | SYSTOLIC BLOOD PRESSURE: 127 MMHG | BODY MASS INDEX: 27.31 KG/M2 | WEIGHT: 160 LBS

## 2022-11-18 DIAGNOSIS — H61.23 IMPACTED CERUMEN, BILATERAL: ICD-10-CM

## 2022-11-18 PROCEDURE — 99214 OFFICE O/P EST MOD 30 MIN: CPT | Mod: 25

## 2022-11-18 PROCEDURE — 69210 REMOVE IMPACTED EAR WAX UNI: CPT

## 2022-11-18 NOTE — ASSESSMENT
[FreeTextEntry1] : b aied, behaving as Meniere's also\par -continue lo sodium diet, hydration, torsemide\par -cerumen removed\par -b tm perforations clean and dry\par -dep\par -reassured dizziness is likely d/t bppv aied, meniere's or virus, she states it is resolved but she will let us know if it occurs again\par o/w RTC in 6 months with

## 2022-11-18 NOTE — REASON FOR VISIT
[Subsequent Evaluation] : a subsequent evaluation for [FreeTextEntry2] : followup aied/ b Meniere's disease

## 2022-11-18 NOTE — HISTORY OF PRESENT ILLNESS
[de-identified] : 2.5 month followup visit for this 56 y/o F with h/o B aied and sx of B Meniere's disease. She had an episode of vertigo about a week ago with positional change. She states it lasted for a few seconds at a time and then resolved. She has h/o bppv. Her hearing has remained stable. She has been treated with a lo sodium diet, l els, courses of PO and IT steroids and rituximab. She is on torsemide and staying well hydrated.

## 2022-11-18 NOTE — PHYSICAL EXAM
[Normal] : no rashes [de-identified] : b copious cerumen removed atraumatically with suction r>l, r 50% central perforation clean and dry, l 1 mm perforation clean and dry, b TMs nl  [] : [place] test is negative [de-identified] : gait steady

## 2022-11-18 NOTE — PROCEDURE
[Cerumen Impaction] : Cerumen Impaction [Same] : same as the Pre Op Dx. [] : Removal of Cerumen [FreeTextEntry5] : b copious cerumen removed atraumatically with suction r>l, r 50% central perforation clean and dry, l 1 mm perforation clean and dry, b TMs nl

## 2022-12-28 ENCOUNTER — RX RENEWAL (OUTPATIENT)
Age: 57
End: 2022-12-28

## 2023-03-08 ENCOUNTER — APPOINTMENT (OUTPATIENT)
Dept: NEPHROLOGY | Facility: CLINIC | Age: 58
End: 2023-03-08
Payer: COMMERCIAL

## 2023-03-08 ENCOUNTER — APPOINTMENT (OUTPATIENT)
Dept: OTOLARYNGOLOGY | Facility: CLINIC | Age: 58
End: 2023-03-08
Payer: COMMERCIAL

## 2023-03-08 VITALS
HEART RATE: 65 BPM | BODY MASS INDEX: 27.31 KG/M2 | TEMPERATURE: 98.3 F | WEIGHT: 160 LBS | SYSTOLIC BLOOD PRESSURE: 124 MMHG | HEIGHT: 64 IN | DIASTOLIC BLOOD PRESSURE: 76 MMHG | OXYGEN SATURATION: 99 %

## 2023-03-08 VITALS — HEART RATE: 69 BPM | DIASTOLIC BLOOD PRESSURE: 30 MMHG | SYSTOLIC BLOOD PRESSURE: 105 MMHG

## 2023-03-08 PROCEDURE — 92550 TYMPANOMETRY & REFLEX THRESH: CPT | Mod: 52

## 2023-03-08 PROCEDURE — 99214 OFFICE O/P EST MOD 30 MIN: CPT

## 2023-03-08 PROCEDURE — 92557 COMPREHENSIVE HEARING TEST: CPT

## 2023-03-08 RX ORDER — ACETAZOLAMIDE 500 MG/1
500 CAPSULE, EXTENDED RELEASE ORAL
Refills: 0 | Status: DISCONTINUED | COMMUNITY
End: 2023-03-08

## 2023-03-08 NOTE — PHYSICAL EXAM
[de-identified] : r tm perforation- clean and dry examined under microscope, l tm healed- examined under microscope  [Normal] : no nystagmus [de-identified] : gait steady

## 2023-03-08 NOTE — HISTORY OF PRESENT ILLNESS
[FreeTextEntry1] : * On magnesium, k-dur, torsemide for meniere's and electrolyte abnormalities. * Received rituximab previously in 2020. * Labs reportedly normal in January. Labs checked by PCP. * BP controlled. No lightheadedness. No CP/SOB. Compliant with medications. \par \par Previous history (28Feb22): * Meniere's disease improved on torsemide. No lightheadedness. * High normal ionized calcium. PTH 18. * Potasium increased to 4.2. On 20 meq qd. * Cr 0.9, K 4.3 on 1/6/23. \par \par Previous history (27Jan22): Labs from 11/21 reviewed. K 3.7, creatinine 1.0, calcium 10.4, alb 5.1. Calcium was 11.8 on 13Jul21 with Dr. Conti. She is on vitamin D but not calcium supplement. 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 This was switched to dyazide 75/50 due to hypkalemia. \par \par She received rituxan in May 2021 and reportedly has low B cell count (0 percent CD20) and is still immunocompromised. Her rheumatologist has been unsuccessful in getting her Evusheld. \par \par K supplemented with KCl 40 meq daily. \par \par Borderline HTN, SBP 120s with Dr. Conti. No lightheadedness. \par

## 2023-03-08 NOTE — HISTORY OF PRESENT ILLNESS
[de-identified] : 5 month followup visit for this 59 y/o F with h/o b aied and sx of Meniere's Disease. She is following a lo sodium diet, and is on torsemide and staying well hydrated. Since last visit she denies episodes of vertigo or fluctuations in her hearing. She hears her heartbeat in her right ear only when she is exercising for the past 2 months. She is also c/o neck pain. She has h/o bppv. She had been treated with po and IT steroids in the past, rituximab, and l els. She has not had an attack for a year.

## 2023-03-08 NOTE — ASSESSMENT
[FreeTextEntry1] : b aied behaving as Meniere's also, r pulsatile tinnitus\par - showed b mixed hearing loss, worse in left ear- stable when compared with 22 -results reviewed with pt \par -continue lo sodium diet- 1500 mg, hydration, torsemide \par -r tm perforation clean and dry, l tm healed\par -explained pulsatile tinnitus is likely from r tm perforation \par -MRA/ MRV head\par -MRA neck \par -MRI in  unremarkable\par -discussed risks/ benefits/ alts to r tympanoplasty- pt has not been on rituximab since May 2020 \par -explained she may have l otosclerosis and discussed risks, benefits, and alternatives to stapedectomy and that she would need a CT scan- would recommend repair of left ear first as hearing is worse in that ear - she prefers to think about this first\par RTC with MRA/ MRV head and MRA neck --> if they are ok consider followup with Dr. Iqbal

## 2023-03-08 NOTE — ASSESSMENT
[FreeTextEntry1] : # Borderline hypercalcemia likeliest due to chlorthalidone, now resolved. \par * Cont lasix instead of chlorthalidone. \par \par # Borderline hypokalemia improved.\par * Recheck labs every 6 - 12 months. Cont K supplement 20 qd. \par \par # Borderline HTN improved. \par * Cont torsemide.\par \par # Meniere's.\par * Cont torsemide. \par

## 2023-03-08 NOTE — DATA REVIEWED
[de-identified] :  showed b mixed hearing loss, worse in left ear- stable when compared with 22 -results reviewed with pt

## 2023-03-17 ENCOUNTER — TRANSCRIPTION ENCOUNTER (OUTPATIENT)
Age: 58
End: 2023-03-17

## 2023-03-20 ENCOUNTER — TRANSCRIPTION ENCOUNTER (OUTPATIENT)
Age: 58
End: 2023-03-20

## 2023-03-25 ENCOUNTER — RX RENEWAL (OUTPATIENT)
Age: 58
End: 2023-03-25

## 2023-03-29 ENCOUNTER — APPOINTMENT (OUTPATIENT)
Dept: OTOLARYNGOLOGY | Facility: CLINIC | Age: 58
End: 2023-03-29
Payer: COMMERCIAL

## 2023-03-29 PROCEDURE — 69801 INCISE INNER EAR: CPT | Mod: RT

## 2023-03-29 PROCEDURE — 99214 OFFICE O/P EST MOD 30 MIN: CPT | Mod: 25

## 2023-03-29 PROCEDURE — 92550 TYMPANOMETRY & REFLEX THRESH: CPT | Mod: 52

## 2023-03-29 PROCEDURE — 92557 COMPREHENSIVE HEARING TEST: CPT

## 2023-03-29 RX ORDER — FAMOTIDINE 40 MG/1
40 TABLET, FILM COATED ORAL
Qty: 14 | Refills: 0 | Status: ACTIVE | COMMUNITY
Start: 2023-03-29 | End: 1900-01-01

## 2023-03-29 NOTE — PROCEDURE
[facility]] : Sudden Hearing Loss [Same] : same as the Pre Op Dx. [] : Intratympanic Therapy [Risk and Benefits Discussed] : The purpose, risks, discomforts, benefits and alternatives of the procedure have been explained to the patient including no treatment. [FreeTextEntry5] : 0.3 cc Dexamethasone injected AD through r tm perforation atraumatically under microscope

## 2023-03-29 NOTE — PHYSICAL EXAM
[de-identified] : l holly martin tm perforation- clean and dry  [Normal] : no nystagmus [de-identified] : gait steady

## 2023-03-29 NOTE — DATA REVIEWED
[de-identified] :  showed l mixed hl- stable, r mixed hl- sn component significantly decreased in lowest frequencies, WR decreased from 100% to 88% when compared with 3/8/23 -results reviewed with pt

## 2023-03-29 NOTE — HISTORY OF PRESENT ILLNESS
[de-identified] : 3 week followup visit for this 57 y/o F with h/o b aied and sx of Meniere's Disease. She is following a lo sodium diet, and is on torsemide, and is staying well hydrated. She is c/o tinnitus and is here to recheck her hearing. She reports that she is starting menopause and had her first menstrual period since September 2022. She noticed r hearing drop about 2 d before and it has now been a week and has not recovered. She had been treated with po and IT steroids in the past, rituximab, and l els.

## 2023-03-29 NOTE — REASON FOR VISIT
[Subsequent Evaluation] : a subsequent evaluation for [FreeTextEntry2] : aied/ b Meniere's Disease, r sudden hl

## 2023-04-05 RX ORDER — POTASSIUM CHLORIDE 1500 MG/1
20 TABLET, FILM COATED, EXTENDED RELEASE ORAL
Qty: 90 | Refills: 3 | Status: ACTIVE | COMMUNITY
Start: 2020-10-28 | End: 1900-01-01

## 2023-04-06 ENCOUNTER — APPOINTMENT (OUTPATIENT)
Dept: OTOLARYNGOLOGY | Facility: CLINIC | Age: 58
End: 2023-04-06
Payer: COMMERCIAL

## 2023-04-06 VITALS
DIASTOLIC BLOOD PRESSURE: 80 MMHG | HEIGHT: 64 IN | HEART RATE: 67 BPM | TEMPERATURE: 98 F | RESPIRATION RATE: 16 BRPM | WEIGHT: 160 LBS | OXYGEN SATURATION: 97 % | SYSTOLIC BLOOD PRESSURE: 125 MMHG | BODY MASS INDEX: 27.31 KG/M2

## 2023-04-06 DIAGNOSIS — H93.A1 PULSATILE TINNITUS, RIGHT EAR: ICD-10-CM

## 2023-04-06 PROCEDURE — 92557 COMPREHENSIVE HEARING TEST: CPT

## 2023-04-06 PROCEDURE — 99214 OFFICE O/P EST MOD 30 MIN: CPT

## 2023-04-06 PROCEDURE — 92550 TYMPANOMETRY & REFLEX THRESH: CPT | Mod: 52

## 2023-04-06 RX ORDER — POTASSIUM CHLORIDE 1500 MG/1
20 TABLET, FILM COATED, EXTENDED RELEASE ORAL
Qty: 90 | Refills: 3 | Status: ACTIVE | COMMUNITY
Start: 2020-09-15 | End: 1900-01-01

## 2023-04-06 NOTE — REASON FOR VISIT
[Subsequent Evaluation] : a subsequent evaluation for [FreeTextEntry2] : aied/b Meniere's Disease, r sudden hearing loss

## 2023-04-06 NOTE — HISTORY OF PRESENT ILLNESS
[de-identified] : 8 day followup visit for this 57 y/o F with h/o b aied and sx of Meniere's Disease. She is following a lo sodium diet, and is on torsemide, and is staying well hydrated. She had recent r sudden hl and at last visit was treated with an IT dexamethasone injxn. She is on po steroids and famotidine. She is here to recheck her hearing. She notices an improvement in hearing, but has intermittent r pulsatile tinnitus. She reports it is not a steady high pitched ring. She had been treated with po and IT steroids in the past, rituximab, and l els.

## 2023-04-06 NOTE — ASSESSMENT
[FreeTextEntry1] : b aied behaving as b Meniere's, r sudden hearing loss, r pulsatile tinnitus\par - showed l mixed hl- stable, r chl- pure tones significantly improved, WR increased from 88% to 100% when compared with 3/29/2023 -results reviewed with pt \par -continue lo sodium diet- 1500 mg, hydration\par -continue diuretic\par -finish po steroids and famotidine\par -s/p 1 IT dexamethasone injxn\par -continue dep\par -recommended she discuss with Dr. Quintanilla in case she has further recommendations\par -explained tinnitus could be d/t b aied/ Meniere's \par -recommended she discuss Potassium supplement (she wished to refilll but there was discrepancy btwn his recommended amt and what she wished refilled) with Dr. Carrillo - she agreed to\par RTC in 3 months with rpt ; call sooner for any issues

## 2023-04-06 NOTE — PHYSICAL EXAM
[de-identified] : l holly martin tm perforation- clean and dry  [Normal] : no neck adenopathy [de-identified] : gait steady

## 2023-04-06 NOTE — DATA REVIEWED
[de-identified] :  showed l mixed hl- stable, r chl- pure tones significantly improved, WR increased from 88% to 100% when compared with 3/29/2023 -results reviewed with pt

## 2023-05-08 ENCOUNTER — APPOINTMENT (OUTPATIENT)
Dept: OTOLARYNGOLOGY | Facility: CLINIC | Age: 58
End: 2023-05-08
Payer: COMMERCIAL

## 2023-05-08 VITALS
RESPIRATION RATE: 16 BRPM | WEIGHT: 160 LBS | HEART RATE: 71 BPM | BODY MASS INDEX: 27.31 KG/M2 | HEIGHT: 64 IN | DIASTOLIC BLOOD PRESSURE: 86 MMHG | OXYGEN SATURATION: 100 % | SYSTOLIC BLOOD PRESSURE: 135 MMHG | TEMPERATURE: 98 F

## 2023-05-08 DIAGNOSIS — H90.A11 CONDUCTIVE HEARING LOSS, UNILATERAL, RIGHT EAR WITH RESTRICTED HEARING ON THE CONTRALATERAL SIDE: ICD-10-CM

## 2023-05-08 PROCEDURE — 69801 INCISE INNER EAR: CPT | Mod: RT

## 2023-05-08 PROCEDURE — 92557 COMPREHENSIVE HEARING TEST: CPT

## 2023-05-08 PROCEDURE — 99214 OFFICE O/P EST MOD 30 MIN: CPT | Mod: 25

## 2023-05-08 PROCEDURE — 92550 TYMPANOMETRY & REFLEX THRESH: CPT | Mod: 52

## 2023-05-08 NOTE — REASON FOR VISIT
[Subsequent Evaluation] : a subsequent evaluation for [FreeTextEntry2] : aied/ b Meniere's Disease, r sudden hearing loss

## 2023-05-08 NOTE — HISTORY OF PRESENT ILLNESS
[de-identified] : 1 month followup visit for this 57 y/o F with h/o b aied and sx of Meniere's Disease. She called the office four days ago with a sudden decrease in her r hearing approximately 1 week ago. She was started on high dose steroids and omeprazole to protect her stomach and is here to check her hearing. She feels it is minimally improved. She is on torsemide, following a lo sodium diet, and is staying well hydrated. She had been treated in the past with po and IT steroids, rituximab, and l els. She had some discomfort with r hearing aid last week.

## 2023-05-08 NOTE — ASSESSMENT
[FreeTextEntry1] : b aied behaving as b Meniere's, r sudden hearing loss approximately 1 week ago\par - showed b mixed hl- r low frequencies decreased when compared with 23 -results reviewed with pt \par -continue lo sodium diet- 1500 mg, hydration\par -continue torsemide\par -finish po steroids as prescribed, omeprazole\par -discussed risks and benefits and alts to IT dexamethasone injxn\par -pt wished to proceed\par -done\par -dep \par -ofloxacin 2 days\par -discussed hbo; she prefers to hold off at present\par RTC in 5 days with

## 2023-05-08 NOTE — PHYSICAL EXAM
[de-identified] : r alfonso perf- clean and dry, l nl  [Normal] : no nystagmus [de-identified] : gait steady

## 2023-05-08 NOTE — DATA REVIEWED
[de-identified] :  showed b mixed hl- r low frequencies decreased when compared with 23 -results reviewed with pt   [de-identified] : MRI 2020 unremarkable -results rereviewed with pt

## 2023-05-08 NOTE — PROCEDURE
[Risk and Benefits Discussed] : The purpose, risks, discomforts, benefits and alternatives of the procedure have been explained to the patient including no treatment. [facility]] : Sudden Hearing Loss [Same] : same as the Pre Op Dx. [] : Intratympanic Therapy [FreeTextEntry4] : phenol  [FreeTextEntry5] : 0.3 cc Dexamethasone injected AD through tm perforation atraumatically under microscope

## 2023-05-15 ENCOUNTER — APPOINTMENT (OUTPATIENT)
Dept: PHARMACY | Facility: CLINIC | Age: 58
End: 2023-05-15
Payer: COMMERCIAL

## 2023-05-15 ENCOUNTER — APPOINTMENT (OUTPATIENT)
Dept: OTOLARYNGOLOGY | Facility: CLINIC | Age: 58
End: 2023-05-15
Payer: COMMERCIAL

## 2023-05-15 VITALS
HEART RATE: 82 BPM | WEIGHT: 162 LBS | OXYGEN SATURATION: 98 % | BODY MASS INDEX: 27.66 KG/M2 | TEMPERATURE: 97.4 F | SYSTOLIC BLOOD PRESSURE: 119 MMHG | HEIGHT: 64 IN | DIASTOLIC BLOOD PRESSURE: 78 MMHG

## 2023-05-15 PROCEDURE — 99214 OFFICE O/P EST MOD 30 MIN: CPT | Mod: 25

## 2023-05-15 PROCEDURE — 92557 COMPREHENSIVE HEARING TEST: CPT

## 2023-05-15 PROCEDURE — 69801 INCISE INNER EAR: CPT | Mod: RT

## 2023-05-15 PROCEDURE — V5299A: CUSTOM | Mod: NC

## 2023-05-15 NOTE — ASSESSMENT
[FreeTextEntry1] : b jmi behaving as b Meniere's Disease, r sudden hearing loss approximately 2 weeks ago\par - showed l mixed hearing loss- stable, r mixed hl- r low frequencies improved when compared with 23, overall decreased compared with 23 -results reviewed with pt \par -s/p po steroids\par -discussed risks and benefits and alts to another IT dexamethasone injxn- pt has had one thus far\par -pt wished to proceed\par -done\par -dep\par -ofloxacin 2 days\par -discussed hbo- she prefers to hold off at present\par -continue lo sodium diet- 1500 mg, hydration\par -continue torsemide\par RTC in 5 days with rpt  or sooner as needed

## 2023-05-15 NOTE — HISTORY OF PRESENT ILLNESS
[de-identified] : 1 week followup visit for this 59 y/o F with h/o b aied and sx of Meniere's Disease. Her right hearing decreased approximately 2 weeks ago. She finished a course of po steroids and has had 1 IT dexamethasone injxn thus far. She is following a lo sodium diet and is on torsemide and  hydrating. She is here to recheck her hearing. She feels it has improved but is not back to her baseline. She is c/o r ear pain for the past week and when asked to localize the pain she points to the angle of her mandible. She has been treated in the past with po and IT steroids, rituximab, and l els.

## 2023-05-15 NOTE — DATA REVIEWED
[de-identified] :  showed l mixed hearing loss- stable, r mixed hl- r low frequencies improved when compared with 23, overall decreased compared with 23 -results reviewed with pt

## 2023-05-15 NOTE — PROCEDURE
[Risk and Benefits Discussed] : The purpose, risks, discomforts, benefits and alternatives of the procedure have been explained to the patient including no treatment. [[facility]] : Sudden Hearing Loss [Same] : same as the Pre Op Dx. [] : Intratympanic Therapy [FreeTextEntry4] : phenol [FreeTextEntry5] : 0.3 cc Dexamethasone injected AD through r tm perforation atraumatically under microscope

## 2023-05-15 NOTE — PHYSICAL EXAM
[Midline] : trachea located in midline position [de-identified] : b TMJ r>l  [de-identified] : r inferior tm perf- clean and dry, l nl  [Normal] : no nystagmus [de-identified] : gait steady

## 2023-05-23 ENCOUNTER — APPOINTMENT (OUTPATIENT)
Dept: OTOLARYNGOLOGY | Facility: CLINIC | Age: 58
End: 2023-05-23
Payer: COMMERCIAL

## 2023-05-23 VITALS
WEIGHT: 162 LBS | BODY MASS INDEX: 27.66 KG/M2 | SYSTOLIC BLOOD PRESSURE: 114 MMHG | RESPIRATION RATE: 16 BRPM | OXYGEN SATURATION: 98 % | HEART RATE: 82 BPM | DIASTOLIC BLOOD PRESSURE: 75 MMHG | HEIGHT: 64 IN | TEMPERATURE: 97.9 F

## 2023-05-23 PROCEDURE — 69801 INCISE INNER EAR: CPT | Mod: RT

## 2023-05-23 PROCEDURE — 99214 OFFICE O/P EST MOD 30 MIN: CPT | Mod: 25

## 2023-05-23 PROCEDURE — 92557 COMPREHENSIVE HEARING TEST: CPT

## 2023-05-23 NOTE — PROCEDURE
[facility] [[facility]] : Sudden Hearing Loss [Same] : same as the Pre Op Dx. [] : Intratympanic Therapy [Risk and Benefits Discussed] : The purpose, risks, discomforts, benefits and alternatives of the procedure have been explained to the patient including no treatment. [FreeTextEntry5] : 0.4 cc Dexamethasone injected AD through tm perforation atraumatically under microscope

## 2023-05-23 NOTE — ASSESSMENT
[FreeTextEntry1] : b aijustin behaving as b Meniere's Disease, r sudden hearing loss approximately 3 weeks ago\par - showed r mixed hearing loss- stable when compared with 5/15/23 (but she felt strongly that it had improved and then worsened to current level after eating sodium rich food), l mixed hearing loss- stable -results reviewed with pt; still decreased as compared with \par -s/p po steroids\par -discussed risks and benefits and alts to another IT dexamethasone injxn- pt has had two thus far\par -pt wished to proceed\par -done\par -dep\par -ofloxacin 2 days\par -continue lo sodium diet- 1500 mg, hydration\par -continue torsemide\par RTC in 5 days with rpt  or sooner as needed

## 2023-05-23 NOTE — PHYSICAL EXAM
[de-identified] : r alfonso perforation- clean and dry, l nl  [Normal] : no nystagmus [de-identified] : gait steady

## 2023-05-23 NOTE — DATA REVIEWED
[de-identified] :  showed r mixed hearing loss- stable when compared with 5/15/23, l mixed hearing loss- stable -results reviewed with pt

## 2023-05-23 NOTE — HISTORY OF PRESENT ILLNESS
[de-identified] : 8 day followup visit for this 57 y/o F with h/o b aied and sx of Meniere's Disease. Her right hearing decreased approximately 2 weeks ago. She finished a course of po steroids and has had 2 IT dexamethasone injxns thus far. She is following a lo sodium diet and is on torsemide and staying well hydrated. She is here to recheck her hearing. She felt her hearing improved after her last injxn, but was away for the weekend in [place] and ate out and believes she received sodium even though she asked for low sodium food and her hearing worsened.  She has been treated in the past with po and IT steroids, rituximab, and l els.

## 2023-05-31 ENCOUNTER — APPOINTMENT (OUTPATIENT)
Dept: OTOLARYNGOLOGY | Facility: CLINIC | Age: 58
End: 2023-05-31
Payer: COMMERCIAL

## 2023-05-31 DIAGNOSIS — H92.01 OTALGIA, RIGHT EAR: ICD-10-CM

## 2023-05-31 DIAGNOSIS — H91.21 SUDDEN IDIOPATHIC HEARING LOSS, RIGHT EAR: ICD-10-CM

## 2023-05-31 PROCEDURE — 99214 OFFICE O/P EST MOD 30 MIN: CPT

## 2023-05-31 PROCEDURE — 92557 COMPREHENSIVE HEARING TEST: CPT

## 2023-05-31 NOTE — ASSESSMENT
[FreeTextEntry1] : 1. b aied behaving as b Meniere's Disease, r sudden hearing loss approximately 1 month ago, vertigo \par - showed l mixed hearing loss- stable, r mixed hearing loss- slightly decreased when compared with 23 - results reviewed with pt \par -s/p po steroids\par -discussed risks, benefits, and alternatives to another IT dexamethasone injxn and more po steroids- pt has had three IT dexamethasone injxns thus far - she preferred to hold off\par -continue lo sodium diet - 1500 mg, hydration\par -continue torsemide\par -medrol dose pack- she said she has one at home\par -she is already on famotidine  \par -explained she could consider r els\par -continue dep \par 2. r otalgia likely d/t TMJ\par -avoid bruxism/ chewing gum\par -aleve BID if pt can tolerate\par -soft diet\par -followup with dentist for mouth guard \par 3. AR\par -avoid allergens\par -continue Flonase \par -zyrtec\par -singulair, confirmed no anxiety/ depression \par explained that if she occludes r eac and applies pressure as she marilee r tm perf, sh will probably experience some vertigo and that she should avoid doing this. \par RTC in 1 month with ; call sooner if her hearing decreases prior to this

## 2023-05-31 NOTE — HISTORY OF PRESENT ILLNESS
[de-identified] : 8 day followup visit for this 59 y/o F with h/o b aied and sx of Meniere's Disease. Her right hearing decreased approximately 1 month ago. She finished a course of oral steroids and has had three IT dexamethasone injxns. She is following lo sodium diet more carefully and is on torsemide. She is here to recheck her hearing. Since last visit she had one episode of vertigo where it felt as if the room was "floating" and she could not focus. She closed her eyes and fell asleep after this. Prior to episode of dizziness she denies fluctuation in hearing, pressure in ears, or tinnitus. Her hearing slightly improved after vertigo but she feels hearing worsened again. She is c/o r ear discomfort and reports when she puts her finger over her right ear she gets a wave of dizziness.  She has been treated in the past with po and IT steroids, rituximab, and l els. She is c/o allergies which have been more bothersome this year. She uses zyrtec and Flonase.

## 2023-05-31 NOTE — DATA REVIEWED
[de-identified] :  showed l mixed hearing loss- stable, r mixed hearing loss- slightly decreased when compared with 23 - results reviewed with pt

## 2023-05-31 NOTE — PHYSICAL EXAM
[Midline] : trachea located in midline position [de-identified] : r>l TMJ  [de-identified] : r tm perforation 50% clean and dry, l nl  [de-identified] : ar [de-identified] : ar [Normal] : no nystagmus [de-identified] : gait steady

## 2023-06-28 ENCOUNTER — APPOINTMENT (OUTPATIENT)
Dept: OTOLARYNGOLOGY | Facility: CLINIC | Age: 58
End: 2023-06-28

## 2023-07-18 ENCOUNTER — APPOINTMENT (OUTPATIENT)
Dept: OTOLARYNGOLOGY | Facility: CLINIC | Age: 58
End: 2023-07-18
Payer: COMMERCIAL

## 2023-07-18 DIAGNOSIS — J30.1 ALLERGIC RHINITIS DUE TO POLLEN: ICD-10-CM

## 2023-07-18 DIAGNOSIS — K21.9 ACUTE LARYNGITIS: ICD-10-CM

## 2023-07-18 DIAGNOSIS — R06.83 SNORING: ICD-10-CM

## 2023-07-18 DIAGNOSIS — J04.0 ACUTE LARYNGITIS: ICD-10-CM

## 2023-07-18 PROCEDURE — 92557 COMPREHENSIVE HEARING TEST: CPT

## 2023-07-18 PROCEDURE — 99214 OFFICE O/P EST MOD 30 MIN: CPT | Mod: 25

## 2023-07-18 PROCEDURE — 31575 DIAGNOSTIC LARYNGOSCOPY: CPT

## 2023-07-18 PROCEDURE — 92550 TYMPANOMETRY & REFLEX THRESH: CPT | Mod: 52

## 2023-07-18 NOTE — PHYSICAL EXAM
[Midline] : trachea located in midline position [Laryngoscopy Performed] : laryngoscopy was performed, see procedure section for findings [Normal] : no nystagmus [de-identified] : b [de-identified] : holly hamilton perf [de-identified] : ar [de-identified] : ar [de-identified] : gait steady

## 2023-07-18 NOTE — DATA REVIEWED
[de-identified] :  showed r conductive hearing loss- significantly improved when compared with 23, l mixed hearing loss- stable compared with 23 -results reviewed with pt

## 2023-07-18 NOTE — REASON FOR VISIT
[Subsequent Evaluation] : a subsequent evaluation for [FreeTextEntry2] : aied/ b Meniere's Disease, snoring

## 2023-07-18 NOTE — ASSESSMENT
[FreeTextEntry1] : 1. AR/ b septal deviation \par -avoid allergens\par -continue Flonase daily\par -continue singulair \par -zyrtec\par -recommended followup with allergist,  name and contact information provided for Dr. Villanueva\par 2. snoring\par -for concerns about jaw, oral surgeon recommended, name and contact information provided for Dr. Mackey\par -follow up with Dr. Mccormack for further recommendations \par 3. reflux laryngitis\par -continue dietary/ mechanical precautions -given printed information sheet \par -continue famotodine- she purchases this over the counter \par 4. b aied behaving as b Meniere's Disease\par - showed r conductive hearing loss- significantly improved when compared with 23, l mixed hearing loss- stable compared with 23 -results reviewed with pt \par -continue with lo sodium diet, torsemide\par RTC in 6 months with rpt ; call sooner for any issues \par

## 2023-07-18 NOTE — HISTORY OF PRESENT ILLNESS
[de-identified] : 6 week followup visit for this 57 y/o F with h/o b aied and sx Meniere's Disease. She was recently treated for r sudden hearing loss. She is following lo sodium diet and is on torsemide. She is here to recheck her hearing. She has been treated in the past with po and IT steroids, rituximab and l els. She is c/o snoring for the past 2 years. She denies sleep apnea but states that the snoring has become a major problem. She has h/o reflux and is on famotidine. For allergic rhinitis she uses zyrtec and Flonase intermittently.

## 2023-07-18 NOTE — PROCEDURE
[Complicated Symptoms] : complicated symptoms requiring more thorough examination than provided by mirror [None] : none [Flexible Endoscope] : examined with the flexible endoscope [Normal] : the epiglottis was regular without inflammation, lesions or masses, with regular aryepiglottic folds, and a smooth petiolus [Interarytenoid Edema] : interarytenoid area edematous [Serial Number: ___] : Serial Number: [unfilled] [de-identified] : done for snoring\par found to have mild iah c/w rl\par nose appears allergic\par b septal deviation\par

## 2023-08-04 ENCOUNTER — APPOINTMENT (OUTPATIENT)
Dept: OTOLARYNGOLOGY | Facility: CLINIC | Age: 58
End: 2023-08-04
Payer: COMMERCIAL

## 2023-08-04 VITALS
BODY MASS INDEX: 27.66 KG/M2 | HEIGHT: 64 IN | WEIGHT: 162 LBS | HEART RATE: 66 BPM | DIASTOLIC BLOOD PRESSURE: 77 MMHG | SYSTOLIC BLOOD PRESSURE: 111 MMHG | OXYGEN SATURATION: 99 % | TEMPERATURE: 98.2 F

## 2023-08-04 PROCEDURE — 69801 INCISE INNER EAR: CPT | Mod: RT

## 2023-08-04 PROCEDURE — 99214 OFFICE O/P EST MOD 30 MIN: CPT | Mod: 25

## 2023-08-04 PROCEDURE — 92557 COMPREHENSIVE HEARING TEST: CPT

## 2023-08-04 PROCEDURE — 92550 TYMPANOMETRY & REFLEX THRESH: CPT | Mod: 52

## 2023-08-04 NOTE — PROCEDURE
[Risk and Benefits Discussed] : The purpose, risks, discomforts, benefits and alternatives of the procedure have been explained to the patient including no treatment. [facility] [Sudden Hearing Loss] : Sudden Hearing Loss [Same] : same as the Pre Op Dx. [] : Intratympanic Therapy [FreeTextEntry5] : 0.3 cc Dexamethasone injected AD thru tm perf atraumatically under microscope

## 2023-08-04 NOTE — ASSESSMENT
[FreeTextEntry1] : b aied behaving as b Meniere's Disease, r sudden hl - showed r conductive hearing loss- decreased when compared with 23, l mixed hearing loss- stable when compared with 23 -results reviewed with pt  -discussed risks and benefits and alts to po and IT dexamethasone injxn -no HTN, DM, PUD, infxn, or psych  -pt wished to proceed with IT; she would agree to medrol dosepack but not full course prednisone due to effects in the past -injx done -dep -medrol- she prefers this over prednisone  -famotidine- she said she is already on this  -ofloxacin 2 days -continue lo sodium diet- 1500 mg, hydration, continue torsemide  RTC in 5 days with

## 2023-08-04 NOTE — HISTORY OF PRESENT ILLNESS
[de-identified] : 2.5 week followup visit for this 59 y/o F with h/o b aied and sx Meniere's Disease. She is following a lo sodium diet and is on torsemide and hydrating. She called the office yesterday with a recent drop in r hearing which started 4 days ago. She recently had her cat euthanized and has had associated stress. She has been treated in the past with po and IT steroids, rituximab, and l els. She has known r tm perf.

## 2023-08-04 NOTE — DATA REVIEWED
[de-identified] :  showed r mixed hearing loss- sn component decreased 15 dB when compared with 23, l mixed hearing loss- stable when compared with 23 -results reviewed with pt

## 2023-08-04 NOTE — PHYSICAL EXAM
FYI- This is a message which I sent to the patient via [facility] [place]:  These tests all remain in an acceptable range. Your screening PSA blood test is also essentially unchanged when compared to your last test done a few years ago.  Gagan Gutierrez MD [de-identified] : l ts; r inf perf clean and dry [Normal] : no nystagmus [FreeTextEntry2] : VII intact b [de-identified] : gait steady

## 2023-08-09 ENCOUNTER — APPOINTMENT (OUTPATIENT)
Dept: OTOLARYNGOLOGY | Facility: CLINIC | Age: 58
End: 2023-08-09

## 2023-08-11 ENCOUNTER — APPOINTMENT (OUTPATIENT)
Dept: OTOLARYNGOLOGY | Facility: CLINIC | Age: 58
End: 2023-08-11

## 2023-08-11 ENCOUNTER — NON-APPOINTMENT (OUTPATIENT)
Age: 58
End: 2023-08-11

## 2023-09-04 ENCOUNTER — NON-APPOINTMENT (OUTPATIENT)
Age: 58
End: 2023-09-04

## 2023-09-05 ENCOUNTER — APPOINTMENT (OUTPATIENT)
Dept: OTOLARYNGOLOGY | Facility: CLINIC | Age: 58
End: 2023-09-05
Payer: COMMERCIAL

## 2023-09-05 VITALS
HEART RATE: 72 BPM | OXYGEN SATURATION: 99 % | BODY MASS INDEX: 27.66 KG/M2 | SYSTOLIC BLOOD PRESSURE: 124 MMHG | TEMPERATURE: 97.5 F | HEIGHT: 64 IN | WEIGHT: 162 LBS | DIASTOLIC BLOOD PRESSURE: 85 MMHG

## 2023-09-05 DIAGNOSIS — G47.33 OBSTRUCTIVE SLEEP APNEA (ADULT) (PEDIATRIC): ICD-10-CM

## 2023-09-05 PROCEDURE — 69801 INCISE INNER EAR: CPT

## 2023-09-05 PROCEDURE — 99214 OFFICE O/P EST MOD 30 MIN: CPT | Mod: 25

## 2023-09-05 PROCEDURE — 92557 COMPREHENSIVE HEARING TEST: CPT

## 2023-09-05 RX ORDER — FAMOTIDINE 40 MG/1
40 TABLET, FILM COATED ORAL
Qty: 7 | Refills: 0 | Status: ACTIVE | COMMUNITY
Start: 2023-09-05 | End: 1900-01-01

## 2023-09-05 NOTE — ASSESSMENT
[FreeTextEntry1] : 1. b aied behaving as b Meniere's Disease, vertigo -r scant cerumen removed - revealed r chl, l mixed hearing loss - r lf minimally improved, otherwise stable when compared with 23 -results reviewed with pt  -discussed risks and benefits and alts to po and IT dexamethasone injxn as she has had 4 recent episodes of vertigo and is concerned. R hearing is better than l in some freqs so gentamicin not an option at present -no HTN, DM, PUD, infxn, or psych  -pt wished to proceed -injx done -dep -medrol dose pack- she could not tolerate prednisone in the past  -famotidine  -ofloxacin 2 days -continue lo sodium diet- 1500 mg, hydration, torsemide -explained vertigo is likely not from oral appliance she wished to discuss r els - i explained would not consider at this point but may possibly be option in the future if does not improve.  2. josé miguel -sleep study revealed mild to moderate JOSÉ MIGUEL -results reviewed with pt  -discussed options including cpap vs oral appliance- pt is seeing Dr. Little for oral appliance - if not beneficial may need cpap RTC in 5 days with

## 2023-09-05 NOTE — DATA REVIEWED
[de-identified] :  revealed r chl, l mixed hearing loss - r lf minimally improved, otherwise stable when compared with 23 -results reviewed with pt  [de-identified] : sleep study revealed mild to moderate GLENN -results reviewed with pt

## 2023-09-05 NOTE — HISTORY OF PRESENT ILLNESS
[de-identified] : 1 month followup visit for this 59 y/o F with h/o b aied and sx of Meniere's Disease. She is following a lo sodium diet and is on torsemide and staying well hydrated. She called the office earlier today with two recent attacks of vertigo. She reports that prior to each episode her right hearing decreased, she had vertigo attack, and then hearing improved. R ear also felt full when hearing dropped.  She is concerned it is related to oral appliance she recently got from Dr. Little. She is also here to review sleep study.

## 2023-09-05 NOTE — PHYSICAL EXAM
[Normal] : assessment of respiratory effort is normal [de-identified] : r scant cerumen removed with curette, r inf tm perf- clean and dry, l ts  [de-identified] : gait steady

## 2023-09-05 NOTE — PROCEDURE
[Risk and Benefits Discussed] : The purpose, risks, discomforts, benefits and alternatives of the procedure have been explained to the patient including no treatment. [Same] : same as the Pre Op Dx. [] : Intratympanic Therapy [Cerumen Impaction] : Cerumen Impaction [FreeTextEntry5] : 0.4 cc Dexamethasone injected AD thru perforation atraumatically under microscope  [FreeTextEntry6] : r scant cerumen removed with curette

## 2023-09-14 ENCOUNTER — APPOINTMENT (OUTPATIENT)
Dept: OTOLARYNGOLOGY | Facility: CLINIC | Age: 58
End: 2023-09-14
Payer: COMMERCIAL

## 2023-09-14 PROCEDURE — 99214 OFFICE O/P EST MOD 30 MIN: CPT | Mod: 25

## 2023-09-14 PROCEDURE — 69801 INCISE INNER EAR: CPT | Mod: RT

## 2023-09-14 PROCEDURE — 92557 COMPREHENSIVE HEARING TEST: CPT

## 2023-09-20 ENCOUNTER — RX RENEWAL (OUTPATIENT)
Age: 58
End: 2023-09-20

## 2023-09-20 RX ORDER — FAMOTIDINE 40 MG/1
40 TABLET, FILM COATED ORAL
Qty: 7 | Refills: 0 | Status: ACTIVE | COMMUNITY
Start: 2023-09-14 | End: 1900-01-01

## 2023-09-26 ENCOUNTER — APPOINTMENT (OUTPATIENT)
Dept: PHARMACY | Facility: CLINIC | Age: 58
End: 2023-09-26
Payer: SELF-PAY

## 2023-09-26 ENCOUNTER — RX RENEWAL (OUTPATIENT)
Age: 58
End: 2023-09-26

## 2023-09-26 ENCOUNTER — APPOINTMENT (OUTPATIENT)
Dept: OTOLARYNGOLOGY | Facility: CLINIC | Age: 58
End: 2023-09-26
Payer: COMMERCIAL

## 2023-09-26 DIAGNOSIS — H81.03 MENIERE'S DISEASE, BILATERAL: ICD-10-CM

## 2023-09-26 PROCEDURE — 99214 OFFICE O/P EST MOD 30 MIN: CPT

## 2023-09-26 PROCEDURE — 92557 COMPREHENSIVE HEARING TEST: CPT

## 2023-09-26 PROCEDURE — V5299A: CUSTOM

## 2023-10-09 ENCOUNTER — APPOINTMENT (OUTPATIENT)
Dept: NEPHROLOGY | Facility: CLINIC | Age: 58
End: 2023-10-09
Payer: COMMERCIAL

## 2023-10-09 ENCOUNTER — APPOINTMENT (OUTPATIENT)
Dept: OTOLARYNGOLOGY | Facility: CLINIC | Age: 58
End: 2023-10-09
Payer: COMMERCIAL

## 2023-10-09 VITALS — DIASTOLIC BLOOD PRESSURE: 64 MMHG | SYSTOLIC BLOOD PRESSURE: 101 MMHG | HEART RATE: 85 BPM

## 2023-10-09 VITALS — WEIGHT: 167 LBS | BODY MASS INDEX: 28.67 KG/M2

## 2023-10-09 VITALS — SYSTOLIC BLOOD PRESSURE: 119 MMHG | DIASTOLIC BLOOD PRESSURE: 83 MMHG

## 2023-10-09 DIAGNOSIS — H72.01 CENTRAL PERFORATION OF TYMPANIC MEMBRANE, RIGHT EAR: ICD-10-CM

## 2023-10-09 PROCEDURE — 99214 OFFICE O/P EST MOD 30 MIN: CPT

## 2023-10-09 PROCEDURE — 36415 COLL VENOUS BLD VENIPUNCTURE: CPT

## 2023-10-09 PROCEDURE — 92557 COMPREHENSIVE HEARING TEST: CPT

## 2023-10-09 PROCEDURE — 99214 OFFICE O/P EST MOD 30 MIN: CPT | Mod: 25

## 2023-10-09 RX ORDER — TORSEMIDE 20 MG/1
20 TABLET ORAL DAILY
Qty: 90 | Refills: 0 | Status: DISCONTINUED | COMMUNITY
Start: 2023-09-26 | End: 2023-10-09

## 2023-10-09 RX ORDER — TORSEMIDE 10 MG/1
10 TABLET ORAL
Qty: 180 | Refills: 3 | Status: ACTIVE | COMMUNITY
Start: 2022-01-27 | End: 1900-01-01

## 2023-10-15 LAB
ALBUMIN SERPL ELPH-MCNC: 4.7 G/DL
ALP BLD-CCNC: 97 U/L
ALT SERPL-CCNC: 36 U/L
ANION GAP SERPL CALC-SCNC: 15 MMOL/L
AST SERPL-CCNC: 27 U/L
BASOPHILS # BLD AUTO: 0.03 K/UL
BASOPHILS NFR BLD AUTO: 0.5 %
BILIRUB SERPL-MCNC: 0.3 MG/DL
BUN SERPL-MCNC: 11 MG/DL
CA-I SERPL-SCNC: 5.1 MG/DL
CALCIUM SERPL-MCNC: 10.2 MG/DL
CALCIUM SERPL-MCNC: 10.2 MG/DL
CHLORIDE SERPL-SCNC: 97 MMOL/L
CO2 SERPL-SCNC: 28 MMOL/L
CREAT SERPL-MCNC: 0.96 MG/DL
CREAT SPEC-SCNC: 149 MG/DL
EGFR: 68 ML/MIN/1.73M2
EOSINOPHIL # BLD AUTO: 0.06 K/UL
EOSINOPHIL NFR BLD AUTO: 1 %
GLUCOSE SERPL-MCNC: 97 MG/DL
HCT VFR BLD CALC: 40.1 %
HGB BLD-MCNC: 13.3 G/DL
IMM GRANULOCYTES NFR BLD AUTO: 0.2 %
LYMPHOCYTES # BLD AUTO: 1.64 K/UL
LYMPHOCYTES NFR BLD AUTO: 27.4 %
MAGNESIUM SERPL-MCNC: 2.2 MG/DL
MAN DIFF?: NORMAL
MCHC RBC-ENTMCNC: 31.4 PG
MCHC RBC-ENTMCNC: 33.2 GM/DL
MCV RBC AUTO: 94.8 FL
MICROALBUMIN 24H UR DL<=1MG/L-MCNC: <1.2 MG/DL
MICROALBUMIN/CREAT 24H UR-RTO: NORMAL MG/G
MONOCYTES # BLD AUTO: 0.57 K/UL
MONOCYTES NFR BLD AUTO: 9.5 %
NEUTROPHILS # BLD AUTO: 3.67 K/UL
NEUTROPHILS NFR BLD AUTO: 61.4 %
PARATHYROID HORMONE INTACT: 33 PG/ML
PLATELET # BLD AUTO: 243 K/UL
POTASSIUM SERPL-SCNC: 4.2 MMOL/L
PROT SERPL-MCNC: 7.2 G/DL
RBC # BLD: 4.23 M/UL
RBC # FLD: 13.3 %
SODIUM SERPL-SCNC: 141 MMOL/L
WBC # FLD AUTO: 5.98 K/UL

## 2023-11-02 ENCOUNTER — NON-APPOINTMENT (OUTPATIENT)
Age: 58
End: 2023-11-02

## 2023-11-16 ENCOUNTER — APPOINTMENT (OUTPATIENT)
Dept: OTOLARYNGOLOGY | Facility: CLINIC | Age: 58
End: 2023-11-16
Payer: COMMERCIAL

## 2023-11-16 VITALS
WEIGHT: 167 LBS | HEART RATE: 88 BPM | SYSTOLIC BLOOD PRESSURE: 104 MMHG | HEIGHT: 64 IN | OXYGEN SATURATION: 98 % | DIASTOLIC BLOOD PRESSURE: 73 MMHG | BODY MASS INDEX: 28.51 KG/M2

## 2023-11-16 DIAGNOSIS — H66.91 OTITIS MEDIA, UNSPECIFIED, RIGHT EAR: ICD-10-CM

## 2023-11-16 DIAGNOSIS — H72.91 OTITIS MEDIA, UNSPECIFIED, RIGHT EAR: ICD-10-CM

## 2023-11-16 PROCEDURE — 99213 OFFICE O/P EST LOW 20 MIN: CPT

## 2023-11-29 ENCOUNTER — APPOINTMENT (OUTPATIENT)
Dept: OTOLARYNGOLOGY | Facility: CLINIC | Age: 58
End: 2023-11-29
Payer: COMMERCIAL

## 2023-11-29 VITALS
BODY MASS INDEX: 28.51 KG/M2 | WEIGHT: 167 LBS | TEMPERATURE: 97.9 F | HEIGHT: 64 IN | HEART RATE: 86 BPM | DIASTOLIC BLOOD PRESSURE: 74 MMHG | SYSTOLIC BLOOD PRESSURE: 105 MMHG | OXYGEN SATURATION: 98 %

## 2023-11-29 DIAGNOSIS — H81.01 MENIERE'S DISEASE, RIGHT EAR: ICD-10-CM

## 2023-11-29 DIAGNOSIS — H72.03 CENTRAL PERFORATION OF TYMPANIC MEMBRANE, BILATERAL: ICD-10-CM

## 2023-11-29 DIAGNOSIS — R42 DIZZINESS AND GIDDINESS: ICD-10-CM

## 2023-11-29 PROCEDURE — 92540 BASIC VESTIBULAR EVALUATION: CPT

## 2023-11-29 PROCEDURE — 99214 OFFICE O/P EST MOD 30 MIN: CPT

## 2023-11-29 PROCEDURE — 92537 CALORIC VSTBLR TEST W/REC: CPT

## 2023-11-29 PROCEDURE — 92567 TYMPANOMETRY: CPT

## 2023-11-29 PROCEDURE — 92557 COMPREHENSIVE HEARING TEST: CPT

## 2023-11-29 RX ORDER — ONDANSETRON 8 MG/1
8 TABLET, ORALLY DISINTEGRATING ORAL
Qty: 21 | Refills: 0 | Status: ACTIVE | COMMUNITY
Start: 2023-11-29 | End: 1900-01-01

## 2023-11-29 RX ORDER — MECLIZINE HYDROCHLORIDE 25 MG/1
25 TABLET ORAL 3 TIMES DAILY
Qty: 60 | Refills: 0 | Status: ACTIVE | COMMUNITY
Start: 2023-11-29 | End: 1900-01-01

## 2023-11-30 ENCOUNTER — NON-APPOINTMENT (OUTPATIENT)
Age: 58
End: 2023-11-30

## 2023-12-06 ENCOUNTER — APPOINTMENT (OUTPATIENT)
Dept: OTOLARYNGOLOGY | Facility: CLINIC | Age: 58
End: 2023-12-06

## 2024-01-17 ENCOUNTER — APPOINTMENT (OUTPATIENT)
Dept: OTOLARYNGOLOGY | Facility: CLINIC | Age: 59
End: 2024-01-17

## 2024-01-23 NOTE — REASON FOR VISIT
Report given to Areli DOMINIQUE [facility].     [Subsequent Evaluation] : a subsequent evaluation for [FreeTextEntry2] : aied/ b Meniere's Disease, r sudden hearing loss

## 2024-01-25 ENCOUNTER — APPOINTMENT (OUTPATIENT)
Dept: OTOLARYNGOLOGY | Facility: CLINIC | Age: 59
End: 2024-01-25
Payer: COMMERCIAL

## 2024-01-25 VITALS
BODY MASS INDEX: 28.51 KG/M2 | OXYGEN SATURATION: 82 % | TEMPERATURE: 98 F | HEART RATE: 54 BPM | DIASTOLIC BLOOD PRESSURE: 74 MMHG | WEIGHT: 167 LBS | HEIGHT: 64 IN | SYSTOLIC BLOOD PRESSURE: 122 MMHG

## 2024-01-25 DIAGNOSIS — H91.21 SUDDEN IDIOPATHIC HEARING LOSS, RIGHT EAR: ICD-10-CM

## 2024-01-25 DIAGNOSIS — Z83.49 FAMILY HISTORY OF OTHER ENDOCRINE, NUTRITIONAL AND METABOLIC DISEASES: ICD-10-CM

## 2024-01-25 DIAGNOSIS — Z87.09 PERSONAL HISTORY OF OTHER DISEASES OF THE RESPIRATORY SYSTEM: ICD-10-CM

## 2024-01-25 DIAGNOSIS — Z82.2 FAMILY HISTORY OF DEAFNESS AND HEARING LOSS: ICD-10-CM

## 2024-01-25 DIAGNOSIS — H90.A31 MIXED CONDUCTIVE AND SENSORINEURAL HEARING LOSS, UNILATERAL, RIGHT EAR WITH RESTRICTED HEARING ON THE  CONTRALATERAL SIDE: ICD-10-CM

## 2024-01-25 DIAGNOSIS — H72.01 CENTRAL PERFORATION OF TYMPANIC MEMBRANE, RIGHT EAR: ICD-10-CM

## 2024-01-25 PROCEDURE — 69801 INCISE INNER EAR: CPT | Mod: RT

## 2024-01-25 PROCEDURE — 99213 OFFICE O/P EST LOW 20 MIN: CPT | Mod: 25

## 2024-01-25 PROCEDURE — 92557 COMPREHENSIVE HEARING TEST: CPT

## 2024-01-25 PROCEDURE — 92567 TYMPANOMETRY: CPT | Mod: LT

## 2024-01-25 RX ORDER — ATORVASTATIN CALCIUM 20 MG/1
20 TABLET, FILM COATED ORAL
Refills: 0 | Status: ACTIVE | COMMUNITY

## 2024-01-26 NOTE — DATA REVIEWED
[de-identified] :  showed b mixed hearing loss- r sensorineural decrease when compared with 23 -results reviewed with pt

## 2024-01-26 NOTE — ASSESSMENT
[FreeTextEntry1] : 1. aied vs b Meniere's - explained it appears like Meniere's resistant to treatment - showed b mixed hearing loss- r sensorineural decrease when compared with 23 -results reviewed with pt  -continue lo sodium diet- 1500 mg, hydration -continue Torsemide 20 mg -continue Prednisone and Omeprazole -discussed risks and benefits and alts to  IT dexamethasone injxn -no HTN, DM, PUD, infxn, or psych  -pt wished to proceed -done -dep -Ofloxacin drops x 2 days -pt is scheduled to see Dr. Phan next week- recommended she discuss Betahistine with her also and other possible options incl kineret and she agreed  2. r tm perforation -clean, dry -dep RTC in 1 week with rpt ; call sooner for any issues  explained to pt that I would always be happy to take care of her but if she prefers to stay with Dr Phan, I compltely respect that. I asked her to call to let me know recommendations.

## 2024-01-26 NOTE — HISTORY OF PRESENT ILLNESS
[de-identified] : 2 month followup visit for this 59 y/o F with h/o b Meniere's Disease, suspicious for AIED. She has seen Dr. Quintanilla for AIED despite testing negative. She has h/o l ELS in the past. She had rituximab in the past which she says did not help. She is on torsemide 20 mg and following a low sodium diet. She has known r tm perforation. She called the office yesterday with a drop in hearing in both ears right > left over the past week and she was advised to come in and check her hearing. She was started on high dose Prednisone. She had three instances of loud tinnitus in early January. She called covering MD who explained her condition was complex and would not prescribe anything. She did not start Beta Histine. She denies vertigo since early December.

## 2024-01-26 NOTE — PHYSICAL EXAM
[de-identified] : r alfonso perf- clean and dry, l nl  [Normal] : no nystagmus [FreeTextEntry2] : VII intact [de-identified] : gait steady

## 2024-01-29 ENCOUNTER — APPOINTMENT (OUTPATIENT)
Dept: OTOLARYNGOLOGY | Facility: CLINIC | Age: 59
End: 2024-01-29
Payer: COMMERCIAL

## 2024-01-29 DIAGNOSIS — H90.6 MIXED CONDUCTIVE AND SENSORINEURAL HEARING LOSS, BILATERAL: ICD-10-CM

## 2024-01-29 PROCEDURE — 92567 TYMPANOMETRY: CPT

## 2024-01-29 PROCEDURE — 99214 OFFICE O/P EST MOD 30 MIN: CPT

## 2024-01-29 PROCEDURE — 92557 COMPREHENSIVE HEARING TEST: CPT

## 2024-01-29 RX ORDER — AMOXICILLIN AND CLAVULANATE POTASSIUM 875; 125 MG/1; MG/1
875-125 TABLET, COATED ORAL
Qty: 20 | Refills: 0 | Status: COMPLETED | COMMUNITY
Start: 2023-11-16 | End: 2024-01-29

## 2024-02-11 PROBLEM — H90.6 MIXED CONDUCTIVE AND SENSORINEURAL HEARING LOSS OF BOTH EARS: Status: ACTIVE | Noted: 2023-09-26

## 2024-02-11 NOTE — PHYSICAL EXAM
[Midline] : trachea located in midline position [Normal] : no rashes [de-identified] : subotal perf AS

## 2024-02-11 NOTE — DATA REVIEWED
[de-identified] : Right - mild to severe mixed hearing loss  Left - mild to moderate mixed hearing loss  Type B tympanograms  w/large ear canal volume  AU

## 2024-02-11 NOTE — HISTORY OF PRESENT ILLNESS
[de-identified] : 58 year old female presents for initial evaluation for Meniere's disease. Referred by Dr. Conti. History of left ear Meniere's disease (1996) and then right ear (2021). s/p endolymphatic shunt left ear by Dr. Conti. Reports an episode of sudden right ear and tinnitus (2020) treated with Medrol pack with relief. Patient reports IT steroid injections in the past with improvement in hearing. Reports intermittent fluctuations in hearing bilaterally in last months. Currently using bilateral hearing aids consistently. Patient was seen by Rheumatologist for possible AIED -previously treated with rituximab with no improvement in hearing. 1 brief episode of dizziness (2021), no spinning but felt objects were moving -lasting about 10 minutes-resolved on its own. Continues on Torsemide states dose has increased due to hearing loss. Patient reports episodes of decreased hearing in August (2023) that gradually improves overtime-never to baseline. Recent steroid injection last Thursday-due to decreased hearing.  Patient denies otalgia, otorrhea, ear infections headaches related to hearing.  Had IT steroid AD last Thursday and oral steroids -  improved.  Right ear dx in 2020, left 1996.  No family hx of Meniere's Disease  - Had left ELSE which "changed my life" - sx in right ear different - mostly hearing issues - was placed on rituximab, no benefit.  Has dry eyes, no other AI sx.

## 2024-03-07 ENCOUNTER — RX RENEWAL (OUTPATIENT)
Age: 59
End: 2024-03-07

## 2024-04-01 ENCOUNTER — RX RENEWAL (OUTPATIENT)
Age: 59
End: 2024-04-01

## 2024-04-11 ENCOUNTER — APPOINTMENT (OUTPATIENT)
Dept: NEPHROLOGY | Facility: CLINIC | Age: 59
End: 2024-04-11
Payer: COMMERCIAL

## 2024-04-11 ENCOUNTER — APPOINTMENT (OUTPATIENT)
Dept: OTOLARYNGOLOGY | Facility: CLINIC | Age: 59
End: 2024-04-11
Payer: COMMERCIAL

## 2024-04-11 VITALS — HEART RATE: 79 BPM | DIASTOLIC BLOOD PRESSURE: 63 MMHG | SYSTOLIC BLOOD PRESSURE: 98 MMHG

## 2024-04-11 VITALS
BODY MASS INDEX: 28.51 KG/M2 | HEART RATE: 83 BPM | WEIGHT: 167 LBS | SYSTOLIC BLOOD PRESSURE: 120 MMHG | HEIGHT: 64 IN | DIASTOLIC BLOOD PRESSURE: 85 MMHG

## 2024-04-11 VITALS — SYSTOLIC BLOOD PRESSURE: 124 MMHG | DIASTOLIC BLOOD PRESSURE: 65 MMHG

## 2024-04-11 DIAGNOSIS — H72.03 CENTRAL PERFORATION OF TYMPANIC MEMBRANE, BILATERAL: ICD-10-CM

## 2024-04-11 PROCEDURE — 99214 OFFICE O/P EST MOD 30 MIN: CPT

## 2024-04-11 PROCEDURE — 92557 COMPREHENSIVE HEARING TEST: CPT

## 2024-04-11 PROCEDURE — 99214 OFFICE O/P EST MOD 30 MIN: CPT | Mod: 25

## 2024-04-11 PROCEDURE — 36415 COLL VENOUS BLD VENIPUNCTURE: CPT

## 2024-04-11 PROCEDURE — 92567 TYMPANOMETRY: CPT

## 2024-04-11 RX ORDER — METHYLPREDNISOLONE 4 MG/1
4 TABLET ORAL
Qty: 1 | Refills: 0 | Status: DISCONTINUED | COMMUNITY
Start: 2021-07-06 | End: 2024-04-11

## 2024-04-11 RX ORDER — METHYLPREDNISOLONE 4 MG/1
4 TABLET ORAL
Qty: 1 | Refills: 0 | Status: DISCONTINUED | COMMUNITY
Start: 2021-11-04 | End: 2024-04-11

## 2024-04-11 RX ORDER — METHYLPREDNISOLONE 4 MG/1
4 TABLET ORAL
Qty: 1 | Refills: 0 | Status: DISCONTINUED | COMMUNITY
Start: 2023-09-05 | End: 2024-04-11

## 2024-04-11 RX ORDER — METHYLPREDNISOLONE 4 MG/1
4 TABLET ORAL
Qty: 1 | Refills: 0 | Status: DISCONTINUED | COMMUNITY
Start: 2020-12-11 | End: 2024-04-11

## 2024-04-11 NOTE — ASSESSMENT
[FreeTextEntry1] : -- # Borderline hypercalcemia likeliest due to chlorthalidone, now resolved. * Recheck ionized calcium.  # Borderline hypokalemia improved. * Recheck labs every 6 - 12 months. Cont K supplement 20 qd.  # Borderline HTN improved. * Cont torsemide. Reduce to 10 mg daily.  * The patient's blood pressure was checked with the [employer] [id number] using the SPRINT trial protocol after sitting quietly in an empty room with arm supported, back supported, and feet on the floor for 5 minutes. The average of 3 readings were taken. * A counseling information sheet on blood pressure and staying healthy has been given (which they have been instructed to read). * The patient has been counseled to check their BP at home with an automatic arm cuff, write down the readings, and reach me directly on the phone immediately if they are persistently > 180 systolic or if SBP is less than 100 or if lightheadedness develops. They were counseled to bring in all blood pressure readings and medications next visit. * The patient has been counseled that regular office follow-up (at least every 4 - 6 for now)  is important for monitoring and for their health, and that it is their responsibility to make follow up appointments. * The patient also has been counseled that they must never stop or change any medications without discussing this with me (or another physician).    # Meniere's. * Cont torsemide.

## 2024-04-11 NOTE — PHYSICAL EXAM
[General Appearance - Alert] : alert [General Appearance - In No Acute Distress] : in no acute distress [Neck Appearance] : the appearance of the neck was normal [Neck Cervical Mass (___cm)] : no neck mass was observed [Jugular Venous Distention Increased] : there was no jugular-venous distention [Thyroid Nodule] : there were no palpable thyroid nodules [Thyroid Diffuse Enlargement] : the thyroid was not enlarged [Auscultation Breath Sounds / Voice Sounds] : lungs were clear to auscultation bilaterally [Heart Rate And Rhythm] : heart rate was normal and rhythm regular [Heart Sounds] : normal S1 and S2 [Heart Sounds Gallop] : no gallops [Murmurs] : no murmurs [Heart Sounds Pericardial Friction Rub] : no pericardial rub [Edema] : there was no peripheral edema [Abdomen Soft] : soft [Abdomen Tenderness] : non-tender [] : no hepato-splenomegaly [Abdomen Mass (___ Cm)] : no abdominal mass palpated [Cervical Lymph Nodes Enlarged Posterior Bilaterally] : posterior cervical [Cervical Lymph Nodes Enlarged Anterior Bilaterally] : anterior cervical [Supraclavicular Lymph Nodes Enlarged Bilaterally] : supraclavicular

## 2024-04-11 NOTE — HISTORY OF PRESENT ILLNESS
[FreeTextEntry1] : She makes wedding cakes.   * BP controlled. BP 110s at home. No SOB with exertion. Rare slight lightheadedness on standing quickly. (Advised to stand slowly, stay well hydrated, and let me know if this worsens.) No CP. Compliant with medications. * On mg and K supplement. * Borderline elvated calcium (normal on calcium).   Previous history (09Nov23): On torsemide 10 mg bid which she requires for Meniere's. Increased 2 weeks ago. * BP controlled. BP > 100s at home. Rare slight lightheadedness on standing quickly. (Advised to stand slowly, stay well hydrated, and let me know if this worsens.) No CP/SOB. Compliant with medications. * On mg and K.  * Borderlin elevated calcium (normal on ionized).   Previous history (08Mar23): * On magnesium, k-dur, torsemide for meniere's and electrolyte abnormalities. * Received rituximab previously in 2020. * Labs reportedly normal in January. Labs checked by PCP. * BP controlled. No lightheadedness. No CP/SOB. Compliant with medications.   Previous history (28Feb22): * Meniere's disease improved on torsemide. No lightheadedness. * High normal ionized calcium. PTH 18. * Potasium increased to 4.2. On 20 meq qd. * Cr 0.9, K 4.3 on 1/6/23.   Previous history (27Jan22): Labs from 11/21 reviewed. K 3.7, creatinine 1.0, calcium 10.4, alb 5.1. Calcium was 11.8 on 13Jul21 with Dr. Conti. She is on vitamin D but not calcium supplement.   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 This was switched to dyazide 75/50 due to hypkalemia.   She received rituxan in May 2021 and reportedly has low B cell count (0 percent CD20) and is still immunocompromised. Her rheumatologist has been unsuccessful in getting her Evusheld.   K supplemented with KCl 40 meq daily.   Borderline HTN, SBP 120s with Dr. Conti. No lightheadedness.

## 2024-04-13 LAB
ALBUMIN SERPL ELPH-MCNC: 5 G/DL
ALP BLD-CCNC: 99 U/L
ALT SERPL-CCNC: 15 U/L
ANION GAP SERPL CALC-SCNC: 16 MMOL/L
APPEARANCE: CLEAR
AST SERPL-CCNC: 17 U/L
BASOPHILS # BLD AUTO: 0.02 K/UL
BASOPHILS NFR BLD AUTO: 0.1 %
BILIRUB SERPL-MCNC: 0.4 MG/DL
BILIRUBIN URINE: NEGATIVE
BLOOD URINE: NEGATIVE
BUN SERPL-MCNC: 28 MG/DL
CALCIUM SERPL-MCNC: 10.6 MG/DL
CHLORIDE SERPL-SCNC: 93 MMOL/L
CO2 SERPL-SCNC: 30 MMOL/L
COLOR: YELLOW
CREAT SERPL-MCNC: 1.15 MG/DL
CREAT SPEC-SCNC: 30 MG/DL
CYSTATIN C SERPL-MCNC: 1.64 MG/L
EGFR: 55 ML/MIN/1.73M2
EOSINOPHIL # BLD AUTO: 0.02 K/UL
EOSINOPHIL NFR BLD AUTO: 0.1 %
GFR/BSA.PRED SERPLBLD CYS-BASED-ARV: 38 ML/MIN/1.73M2
GLUCOSE QUALITATIVE U: NEGATIVE MG/DL
GLUCOSE SERPL-MCNC: 104 MG/DL
HCT VFR BLD CALC: 44.6 %
HGB BLD-MCNC: 14.7 G/DL
IMM GRANULOCYTES NFR BLD AUTO: 0.8 %
KETONES URINE: NEGATIVE MG/DL
LEUKOCYTE ESTERASE URINE: NEGATIVE
LYMPHOCYTES # BLD AUTO: 1.67 K/UL
LYMPHOCYTES NFR BLD AUTO: 11.5 %
MAGNESIUM SERPL-MCNC: 2.4 MG/DL
MAN DIFF?: NORMAL
MCHC RBC-ENTMCNC: 31.4 PG
MCHC RBC-ENTMCNC: 33 GM/DL
MCV RBC AUTO: 95.3 FL
MICROALBUMIN 24H UR DL<=1MG/L-MCNC: <1.2 MG/DL
MICROALBUMIN/CREAT 24H UR-RTO: NORMAL MG/G
MONOCYTES # BLD AUTO: 0.81 K/UL
MONOCYTES NFR BLD AUTO: 5.6 %
NEUTROPHILS # BLD AUTO: 11.88 K/UL
NEUTROPHILS NFR BLD AUTO: 81.9 %
NITRITE URINE: NEGATIVE
PH URINE: 7
PHOSPHATE SERPL-MCNC: 3.7 MG/DL
PLATELET # BLD AUTO: 331 K/UL
POTASSIUM SERPL-SCNC: 5 MMOL/L
PROT SERPL-MCNC: 7.5 G/DL
PROTEIN URINE: NEGATIVE MG/DL
RBC # BLD: 4.68 M/UL
RBC # FLD: 14.5 %
SODIUM SERPL-SCNC: 140 MMOL/L
SPECIFIC GRAVITY URINE: 1.01
UROBILINOGEN URINE: 0.2 MG/DL
WBC # FLD AUTO: 14.51 K/UL

## 2024-04-14 PROBLEM — H72.03 CENTRAL PERFORATION OF TYMPANIC MEMBRANE OF BOTH EARS: Status: ACTIVE | Noted: 2022-01-27

## 2024-04-14 NOTE — HISTORY OF PRESENT ILLNESS
[de-identified] : 58 yo F with asymmetric SNHL and poorly controlled Meniere's disease presents for 1 month follow up. Continues to wear hearing aids regularly. States that hearing improved after taking Betahistine TID for 10 days and then over a week ago while traveling, had decrease in hearing (unsure laterality) with no vertigo. States increased family stress.  Currently took 60mg 7 days of 60 now at 40 felt helped. Feels that hearing AS improved last night and currently on Prednisone 40 mg (day 2). Continues to have baseline tinnitus. No otalgia, otorrhea, ear infections, dizziness or headaches related to hearing.

## 2024-04-14 NOTE — DATA REVIEWED
[de-identified] : Bilateral mild to severe essentially sensorineural hearing loss  Tymp Right - CNS Left - Type B tympanogram w/large ear canal volume

## 2024-04-17 LAB — CA-I SERPL-SCNC: 5.4 MG/DL

## 2024-04-24 RX ORDER — MONTELUKAST 10 MG/1
10 TABLET, FILM COATED ORAL DAILY
Qty: 90 | Refills: 2 | Status: ACTIVE | COMMUNITY
Start: 2023-05-31 | End: 1900-01-01

## 2024-05-03 ENCOUNTER — APPOINTMENT (OUTPATIENT)
Dept: PHARMACY | Facility: CLINIC | Age: 59
End: 2024-05-03
Payer: COMMERCIAL

## 2024-05-03 ENCOUNTER — APPOINTMENT (OUTPATIENT)
Dept: OTOLARYNGOLOGY | Facility: CLINIC | Age: 59
End: 2024-05-03
Payer: COMMERCIAL

## 2024-05-03 ENCOUNTER — APPOINTMENT (OUTPATIENT)
Dept: PHARMACY | Facility: CLINIC | Age: 59
End: 2024-05-03
Payer: SELF-PAY

## 2024-05-03 PROCEDURE — 92567 TYMPANOMETRY: CPT

## 2024-05-03 PROCEDURE — 92557 COMPREHENSIVE HEARING TEST: CPT

## 2024-05-03 PROCEDURE — V5299A: CUSTOM

## 2024-05-08 ENCOUNTER — APPOINTMENT (OUTPATIENT)
Dept: OTOLARYNGOLOGY | Facility: CLINIC | Age: 59
End: 2024-05-08
Payer: COMMERCIAL

## 2024-05-08 VITALS — HEIGHT: 64 IN | BODY MASS INDEX: 28.51 KG/M2 | WEIGHT: 167 LBS

## 2024-05-08 DIAGNOSIS — H83.8X3 OTHER SPECIFIED DISEASES OF INNER EAR, BILATERAL: ICD-10-CM

## 2024-05-08 DIAGNOSIS — H81.03 MENIERE'S DISEASE, BILATERAL: ICD-10-CM

## 2024-05-08 DIAGNOSIS — H90.A32 MIXED CONDUCTIVE AND SENSORINEURAL HEARING, UNILATERAL, LEFT EAR WITH RESTRICTED HEARING ON THE  CONTRALATERAL SIDE: ICD-10-CM

## 2024-05-08 PROCEDURE — 92567 TYMPANOMETRY: CPT

## 2024-05-08 PROCEDURE — 99214 OFFICE O/P EST MOD 30 MIN: CPT

## 2024-05-08 PROCEDURE — 92557 COMPREHENSIVE HEARING TEST: CPT

## 2024-05-08 NOTE — PHYSICAL EXAM
[de-identified] : subotal perf AS; inferior perf AD [Normal] : mucosa is normal [Midline] : trachea located in midline position

## 2024-05-08 NOTE — DATA REVIEWED
[de-identified] : Severe rising to mild mixed loss to 4kHz sloping to a moderate loss to 8kHz, AS Severe rising to moderate mixed loss to 4kHz sloping to a moderate-severe loss to 8kHz, AD Large ECV, AS

## 2024-05-08 NOTE — HISTORY OF PRESENT ILLNESS
[de-identified] : 58 y/o F presents for 1 month follow up asymmetric SNHL, Meniere's Disease, and drop in hearing. Completed prednisone course. She reports b/l hearing continues to fluctuate. She continues on betahistine. Denies dizziness. Patient with allergy testing last year (skin) apparently all negative. To get records.  Detail Level: Detailed Depth Of Biopsy: dermis Was A Bandage Applied: Yes Size Of Lesion In Cm: 0 Biopsy Type: H and E Biopsy Method: Dermablade Anesthesia Type: 1% lidocaine with epinephrine Anesthesia Volume In Cc (Will Not Render If 0): 0.5 Hemostasis: Drysol Wound Care: Petrolatum Dressing: bandage Destruction After The Procedure: No Type Of Destruction Used: Curettage Curettage Text: The wound bed was treated with curettage after the biopsy was performed. Cryotherapy Text: The wound bed was treated with cryotherapy after the biopsy was performed. Electrodesiccation Text: The wound bed was treated with electrodesiccation after the biopsy was performed. Electrodesiccation And Curettage Text: The wound bed was treated with electrodesiccation and curettage after the biopsy was performed. Silver Nitrate Text: The wound bed was treated with silver nitrate after the biopsy was performed. Lab: 6 Lab Facility: 3 Consent: Written consent was obtained and risks were reviewed including but not limited to scarring, infection, bleeding, scabbing, incomplete removal, nerve damage and allergy to anesthesia. Post-Care Instructions: I reviewed with the patient in detail post-care instructions. Patient is to keep the biopsy site dry overnight, and then apply bacitracin twice daily until healed. Patient may apply hydrogen peroxide soaks to remove any crusting. Notification Instructions: Patient will be notified of biopsy results. However, patient instructed to call the office if not contacted within 2 weeks. Billing Type: Third-Party Bill Information: Selecting Yes will display possible errors in your note based on the variables you have selected. This validation is only offered as a suggestion for you. PLEASE NOTE THAT THE VALIDATION TEXT WILL BE REMOVED WHEN YOU FINALIZE YOUR NOTE. IF YOU WANT TO FAX A PRELIMINARY NOTE YOU WILL NEED TO TOGGLE THIS TO 'NO' IF YOU DO NOT WANT IT IN YOUR FAXED NOTE.

## 2024-06-07 ENCOUNTER — APPOINTMENT (OUTPATIENT)
Dept: NEPHROLOGY | Facility: CLINIC | Age: 59
End: 2024-06-07
Payer: COMMERCIAL

## 2024-06-07 DIAGNOSIS — E83.52 HYPERCALCEMIA: ICD-10-CM

## 2024-06-07 DIAGNOSIS — Z79.899 OTHER LONG TERM (CURRENT) DRUG THERAPY: ICD-10-CM

## 2024-06-07 DIAGNOSIS — E87.6 HYPOKALEMIA: ICD-10-CM

## 2024-06-07 DIAGNOSIS — R03.0 ELEVATED BLOOD-PRESSURE READING, W/OUT DIAGNOSIS OF HYPERTENSION: ICD-10-CM

## 2024-06-07 DIAGNOSIS — Z86.69 PERSONAL HISTORY OF OTHER DISEASES OF THE NERVOUS SYSTEM AND SENSE ORGANS: ICD-10-CM

## 2024-06-07 PROCEDURE — 99442: CPT

## 2024-06-07 NOTE — ASSESSMENT
[FreeTextEntry1] : # Borderline hypercalcemia likeliest due to chlorthalidone, now resolved. * Recheck ionized calcium.  # Borderline hypokalemia improved. * Recheck labs every 6 - 12 months. Cont K supplement 20 qd.  # Borderline HTN improved. * Cont torsemide.  # Meniere's. * Cont torsemide.  # Elevated creatinine. * Likeliest due to diuretics. * Recheck labs.

## 2024-06-07 NOTE — HISTORY OF PRESENT ILLNESS
[Home] : at home, [unfilled] , at the time of the visit. [Medical Office: ([facility])___] : at the medical office located in  [Verbal consent obtained from patient] : the patient, [unfilled] [FreeTextEntry1] : She makes wedding cakes.    * BP controlled. BP 110s at home. No SOB with exertion. No lightheaddedness.* On mg and K supplement. On torsemide 10. * Cr 1.15, Ca 10.6. Torsemide had decreased from 20 to 10.   Previous history (11Apr24): * BP controlled. BP 110s at home. No SOB with exertion. Rare slight lightheadedness on standing quickly. (Advised to stand slowly, stay well hydrated, and let me know if this worsens.) No CP. Compliant with medications. * On mg and K supplement. * Borderline elvated calcium (normal on calcium).   Previous history (09Nov23): On torsemide 10 mg bid which she requires for Meniere's. Increased 2 weeks ago. * BP controlled. BP > 100s at home. Rare slight lightheadedness on standing quickly. (Advised to stand slowly, stay well hydrated, and let me know if this worsens.) No CP/SOB. Compliant with medications. * On mg and K.  * Borderlin elevated calcium (normal on ionized).   Previous history (08Mar23): * On magnesium, k-dur, torsemide for meniere's and electrolyte abnormalities. * Received rituximab previously in 2020. * Labs reportedly normal in January. Labs checked by PCP. * BP controlled. No lightheadedness. No CP/SOB. Compliant with medications.   Previous history (28Feb22): * Meniere's disease improved on torsemide. No lightheadedness. * High normal ionized calcium. PTH 18. * Potasium increased to 4.2. On 20 meq qd. * Cr 0.9, K 4.3 on 1/6/23.   Previous history (27Jan22): Labs from 11/21 reviewed. K 3.7, creatinine 1.0, calcium 10.4, alb 5.1. Calcium was 11.8 on 13Jul21 with Dr. Conti. She is on vitamin D but not calcium supplement.   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 This was switched to dyazide 75/50 due to hypkalemia.   She received rituxan in May 2021 and reportedly has low B cell count (0 percent CD20) and is still immunocompromised. Her rheumatologist has been unsuccessful in getting her Evusheld.   K supplemented with KCl 40 meq daily.   Borderline HTN, SBP 120s with Dr. Conti. No lightheadedness.

## 2024-06-24 ENCOUNTER — APPOINTMENT (OUTPATIENT)
Dept: OTOLARYNGOLOGY | Facility: CLINIC | Age: 59
End: 2024-06-24
Payer: COMMERCIAL

## 2024-06-24 DIAGNOSIS — H83.8X3 OTHER SPECIFIED DISEASES OF INNER EAR, BILATERAL: ICD-10-CM

## 2024-06-24 DIAGNOSIS — H91.23 SUDDEN IDIOPATHIC HEARING LOSS, BILATERAL: ICD-10-CM

## 2024-06-24 DIAGNOSIS — H81.03 MENIERE'S DISEASE, BILATERAL: ICD-10-CM

## 2024-06-24 PROCEDURE — 92567 TYMPANOMETRY: CPT

## 2024-06-24 PROCEDURE — 92557 COMPREHENSIVE HEARING TEST: CPT

## 2024-06-24 PROCEDURE — 99214 OFFICE O/P EST MOD 30 MIN: CPT

## 2024-06-24 RX ORDER — OMEPRAZOLE 40 MG/1
40 CAPSULE, DELAYED RELEASE ORAL
Qty: 7 | Refills: 0 | Status: DISCONTINUED | COMMUNITY
Start: 2021-07-06 | End: 2024-06-24

## 2024-06-24 RX ORDER — METHYLPREDNISOLONE 4 MG/1
4 TABLET ORAL
Qty: 1 | Refills: 0 | Status: DISCONTINUED | COMMUNITY
Start: 2020-06-29 | End: 2024-06-24

## 2024-06-24 RX ORDER — FAMOTIDINE 40 MG/1
40 TABLET, FILM COATED ORAL DAILY
Qty: 7 | Refills: 0 | Status: DISCONTINUED | COMMUNITY
Start: 2021-11-04 | End: 2024-06-24

## 2024-06-24 RX ORDER — OFLOXACIN OTIC 3 MG/ML
0.3 SOLUTION AURICULAR (OTIC)
Qty: 1 | Refills: 1 | Status: DISCONTINUED | COMMUNITY
Start: 2021-07-06 | End: 2024-06-24

## 2024-06-24 RX ORDER — OMEPRAZOLE 40 MG/1
40 CAPSULE, DELAYED RELEASE ORAL
Qty: 7 | Refills: 0 | Status: DISCONTINUED | COMMUNITY
Start: 2020-08-19 | End: 2024-06-24

## 2024-06-24 RX ORDER — METHYLPREDNISOLONE 4 MG/1
4 TABLET ORAL
Qty: 1 | Refills: 0 | Status: DISCONTINUED | COMMUNITY
Start: 2020-11-12 | End: 2024-06-24

## 2024-06-24 RX ORDER — PREDNISONE 10 MG/1
10 TABLET ORAL
Qty: 70 | Refills: 0 | Status: DISCONTINUED | COMMUNITY
Start: 2024-04-02 | End: 2024-06-24

## 2024-06-24 RX ORDER — PREDNISONE 10 MG/1
10 TABLET ORAL
Qty: 45 | Refills: 0 | Status: DISCONTINUED | COMMUNITY
Start: 2023-05-04 | End: 2024-06-24

## 2024-06-24 RX ORDER — OFLOXACIN OTIC 3 MG/ML
0.3 SOLUTION AURICULAR (OTIC)
Qty: 1 | Refills: 1 | Status: DISCONTINUED | COMMUNITY
Start: 2023-03-29 | End: 2024-06-24

## 2024-06-24 RX ORDER — OMEPRAZOLE 40 MG/1
40 CAPSULE, DELAYED RELEASE ORAL
Qty: 7 | Refills: 0 | Status: DISCONTINUED | COMMUNITY
Start: 2021-07-19 | End: 2024-06-24

## 2024-06-24 RX ORDER — OFLOXACIN OTIC 3 MG/ML
0.3 SOLUTION AURICULAR (OTIC)
Qty: 2 | Refills: 1 | Status: DISCONTINUED | COMMUNITY
Start: 2020-08-19 | End: 2024-06-24

## 2024-06-24 RX ORDER — PREDNISONE 10 MG/1
10 TABLET ORAL
Qty: 70 | Refills: 0 | Status: ACTIVE | COMMUNITY
Start: 2024-06-24 | End: 1900-01-01

## 2024-06-24 RX ORDER — PREDNISONE 10 MG/1
10 TABLET ORAL
Qty: 33 | Refills: 0 | Status: DISCONTINUED | COMMUNITY
Start: 2021-07-13 | End: 2024-06-24

## 2024-06-24 RX ORDER — OFLOXACIN OTIC 3 MG/ML
0.3 SOLUTION AURICULAR (OTIC)
Qty: 1 | Refills: 1 | Status: DISCONTINUED | COMMUNITY
Start: 2022-01-27 | End: 2024-06-24

## 2024-06-24 RX ORDER — PREDNISONE 10 MG/1
10 TABLET ORAL
Qty: 45 | Refills: 0 | Status: DISCONTINUED | COMMUNITY
Start: 2023-03-29 | End: 2024-06-24

## 2024-06-24 RX ORDER — PREDNISONE 10 MG/1
10 TABLET ORAL
Qty: 15 | Refills: 0 | Status: DISCONTINUED | COMMUNITY
Start: 2021-07-19 | End: 2024-06-24

## 2024-06-24 RX ORDER — METHYLPREDNISOLONE 4 MG/1
4 TABLET ORAL
Qty: 1 | Refills: 0 | Status: DISCONTINUED | COMMUNITY
Start: 2020-10-01 | End: 2024-06-24

## 2024-06-24 RX ORDER — OFLOXACIN OTIC 3 MG/ML
0.3 SOLUTION AURICULAR (OTIC)
Qty: 1 | Refills: 1 | Status: DISCONTINUED | COMMUNITY
Start: 2023-09-05 | End: 2024-06-24

## 2024-06-24 RX ORDER — PREDNISONE 10 MG/1
10 TABLET ORAL
Qty: 45 | Refills: 0 | Status: DISCONTINUED | COMMUNITY
Start: 2023-09-14 | End: 2024-06-24

## 2024-06-24 RX ORDER — METHYLPREDNISOLONE 4 MG/1
4 TABLET ORAL
Qty: 1 | Refills: 0 | Status: DISCONTINUED | COMMUNITY
Start: 2020-08-19 | End: 2024-06-24

## 2024-06-24 RX ORDER — OMEPRAZOLE 40 MG/1
40 CAPSULE, DELAYED RELEASE ORAL
Qty: 7 | Refills: 0 | Status: DISCONTINUED | COMMUNITY
Start: 2020-11-12 | End: 2024-06-24

## 2024-06-24 RX ORDER — OFLOXACIN OTIC 3 MG/ML
0.3 SOLUTION AURICULAR (OTIC)
Qty: 1 | Refills: 1 | Status: DISCONTINUED | COMMUNITY
Start: 2023-08-04 | End: 2024-06-24

## 2024-06-24 RX ORDER — OFLOXACIN OTIC 3 MG/ML
0.3 SOLUTION AURICULAR (OTIC)
Qty: 1 | Refills: 1 | Status: DISCONTINUED | COMMUNITY
Start: 2020-10-01 | End: 2024-06-24

## 2024-06-24 RX ORDER — PREDNISONE 10 MG/1
10 TABLET ORAL
Qty: 45 | Refills: 0 | Status: DISCONTINUED | COMMUNITY
Start: 2024-01-24 | End: 2024-06-24

## 2024-06-24 RX ORDER — OMEPRAZOLE 40 MG/1
40 CAPSULE, DELAYED RELEASE ORAL
Qty: 30 | Refills: 5 | Status: DISCONTINUED | COMMUNITY
Start: 2020-10-01 | End: 2024-06-24

## 2024-06-24 RX ORDER — OFLOXACIN OTIC 3 MG/ML
0.3 SOLUTION AURICULAR (OTIC)
Qty: 1 | Refills: 1 | Status: DISCONTINUED | COMMUNITY
Start: 2024-01-25 | End: 2024-06-24

## 2024-06-24 RX ORDER — OMEPRAZOLE 40 MG/1
40 CAPSULE, DELAYED RELEASE ORAL
Qty: 7 | Refills: 0 | Status: DISCONTINUED | COMMUNITY
Start: 2020-06-29 | End: 2024-06-24

## 2024-06-24 RX ORDER — OFLOXACIN OTIC 3 MG/ML
0.3 SOLUTION AURICULAR (OTIC)
Qty: 1 | Refills: 1 | Status: DISCONTINUED | COMMUNITY
Start: 2020-06-29 | End: 2024-06-24

## 2024-06-24 RX ORDER — OMEPRAZOLE 40 MG/1
40 CAPSULE, DELAYED RELEASE ORAL
Qty: 14 | Refills: 0 | Status: DISCONTINUED | COMMUNITY
Start: 2021-07-13 | End: 2024-06-24

## 2024-06-24 RX ORDER — METHYLPREDNISOLONE 4 MG/1
4 TABLET ORAL
Qty: 1 | Refills: 0 | Status: DISCONTINUED | COMMUNITY
Start: 2023-08-04 | End: 2024-06-24

## 2024-06-28 ENCOUNTER — TRANSCRIPTION ENCOUNTER (OUTPATIENT)
Age: 59
End: 2024-06-28

## 2024-06-29 LAB
ALBUMIN MFR SERPL ELPH: 62.9 %
ALBUMIN SERPL ELPH-MCNC: 4.6 G/DL
ALBUMIN SERPL-MCNC: 4.3 G/DL
ALBUMIN/GLOB SERPL: 1.7 RATIO
ALBUPE: NORMAL %
ALP BLD-CCNC: 101 U/L
ALPHA1 GLOB MFR SERPL ELPH: 3.9 %
ALPHA1 GLOB SERPL ELPH-MCNC: 0.3 G/DL
ALPHA1UPE: NORMAL %
ALPHA2 GLOB MFR SERPL ELPH: 10.4 %
ALPHA2 GLOB SERPL ELPH-MCNC: 0.7 G/DL
ALPHA2UPE: NORMAL %
ALT SERPL-CCNC: 32 U/L
ANION GAP SERPL CALC-SCNC: 13 MMOL/L
APPEARANCE: CLEAR
AST SERPL-CCNC: 27 U/L
B-GLOBULIN MFR SERPL ELPH: 11.2 %
B-GLOBULIN SERPL ELPH-MCNC: 0.8 G/DL
BASOPHILS # BLD AUTO: 0.04 K/UL
BASOPHILS NFR BLD AUTO: 0.7 %
BETAUPE: NORMAL %
BILIRUB SERPL-MCNC: 0.2 MG/DL
BILIRUBIN URINE: NEGATIVE
BLOOD URINE: NEGATIVE
BUN SERPL-MCNC: 16 MG/DL
CA-I SERPL-SCNC: 5.2 MG/DL
CALCIUM SERPL-MCNC: 10 MG/DL
CHLORIDE SERPL-SCNC: 103 MMOL/L
CO2 SERPL-SCNC: 26 MMOL/L
COLOR: YELLOW
CREAT SERPL-MCNC: 1.03 MG/DL
CREAT SPEC-SCNC: 91 MG/DL
CYSTATIN C SERPL-MCNC: 1.09 MG/L
DEPRECATED KAPPA LC FREE/LAMBDA SER: 1.27 RATIO
EGFR: 62 ML/MIN/1.73M2
EOSINOPHIL # BLD AUTO: 0.14 K/UL
EOSINOPHIL NFR BLD AUTO: 2.6 %
GAMMA GLOB FLD ELPH-MCNC: 0.8 G/DL
GAMMA GLOB MFR SERPL ELPH: 11.6 %
GAMMAUPE: NORMAL %
GFR/BSA.PRED SERPLBLD CYS-BASED-ARV: 65 ML/MIN/1.73M2
GLUCOSE QUALITATIVE U: NEGATIVE MG/DL
GLUCOSE SERPL-MCNC: 102 MG/DL
HCT VFR BLD CALC: 36.8 %
HGB BLD-MCNC: 12.5 G/DL
IGA 24H UR QL IFE: NORMAL
IGA SER QL IEP: 93 MG/DL
IGG SER QL IEP: 819 MG/DL
IGM SER QL IEP: 38 MG/DL
IMM GRANULOCYTES NFR BLD AUTO: 0.2 %
INTERPRETATION SERPL IEP-IMP: NORMAL
KAPPA LC 24H UR QL: NORMAL MG/DL
KAPPA LC CSF-MCNC: 1.23 MG/DL
KAPPA LC SERPL-MCNC: 1.56 MG/DL
KETONES URINE: NEGATIVE MG/DL
LEUKOCYTE ESTERASE URINE: NEGATIVE
LYMPHOCYTES # BLD AUTO: 1.81 K/UL
LYMPHOCYTES NFR BLD AUTO: 33.7 %
M PROTEIN SPEC IFE-MCNC: NORMAL
MAGNESIUM SERPL-MCNC: 2.4 MG/DL
MAN DIFF?: NORMAL
MCHC RBC-ENTMCNC: 32.3 PG
MCHC RBC-ENTMCNC: 34 GM/DL
MCV RBC AUTO: 95.1 FL
MICROALBUMIN 24H UR DL<=1MG/L-MCNC: <1.2 MG/DL
MICROALBUMIN/CREAT 24H UR-RTO: NORMAL MG/G
MONOCYTES # BLD AUTO: 0.51 K/UL
MONOCYTES NFR BLD AUTO: 9.5 %
NEUTROPHILS # BLD AUTO: 2.86 K/UL
NEUTROPHILS NFR BLD AUTO: 53.3 %
NITRITE URINE: NEGATIVE
PH URINE: 6
PHOSPHATE SERPL-MCNC: 3.4 MG/DL
PLATELET # BLD AUTO: 249 K/UL
POTASSIUM SERPL-SCNC: 4.8 MMOL/L
PROT PATTERN 24H UR ELPH-IMP: NORMAL
PROT SERPL-MCNC: 6.8 G/DL
PROT UR-MCNC: 7 MG/DL
PROT UR-MCNC: 7 MG/DL
PROTEIN URINE: NEGATIVE MG/DL
RBC # BLD: 3.87 M/UL
RBC # FLD: 14 %
SODIUM SERPL-SCNC: 142 MMOL/L
SPECIFIC GRAVITY URINE: 1.02
UROBILINOGEN URINE: 0.2 MG/DL
WBC # FLD AUTO: 5.37 K/UL

## 2024-07-23 ENCOUNTER — NON-APPOINTMENT (OUTPATIENT)
Age: 59
End: 2024-07-23

## 2024-07-24 ENCOUNTER — APPOINTMENT (OUTPATIENT)
Dept: OTOLARYNGOLOGY | Facility: CLINIC | Age: 59
End: 2024-07-24
Payer: COMMERCIAL

## 2024-07-24 DIAGNOSIS — H83.8X3 OTHER SPECIFIED DISEASES OF INNER EAR, BILATERAL: ICD-10-CM

## 2024-07-24 DIAGNOSIS — H81.03 MENIERE'S DISEASE, BILATERAL: ICD-10-CM

## 2024-07-24 PROCEDURE — 92567 TYMPANOMETRY: CPT

## 2024-07-24 PROCEDURE — 99214 OFFICE O/P EST MOD 30 MIN: CPT

## 2024-07-24 PROCEDURE — 92557 COMPREHENSIVE HEARING TEST: CPT

## 2024-07-25 NOTE — ASSESSMENT
[FreeTextEntry1] : b aied behaving as Meniere's also, r sudden hearing loss\par - showed l mixed hl- stable, r mixed hl- decreased in lowest frequencies, WR decreased from 100% to 88% when compared with 3/8/23 -results reviewed with pt \par -continue lo sodium diet- 1500 mg, hydration\par -continue diuretic \par -discussed risks and benefits and alts to po prednisone and IT dexamethasone injxn\par -no HTN, DM, PUD, infxn, or psych \par -pt wished to proceed\par -done\par -dep\par -prednisone- if she spends time with her father who was recently diagnosed with COVID-19 infxn recommended she stop po steroids\par -she prefers to take famotidine instead of omeprazole- and she said she has some at home\par -ofloxacin 2 days\par RTC in 5 days with   There are no Wet Read(s) to document.

## 2024-07-28 NOTE — HISTORY OF PRESENT ILLNESS
[de-identified] : 59 year old woman, 1 month follow up for [facility] - worsening at Right ear - s/p oral Prednisone taper. History of Meniere's disease - wears hearing aids.  Taking Betahistine as prescribed - states hearing is "all over the place" - fluctuates, sometimes better, sometimes worse.  Reports wearing mouth guard/retainer at night, which exacerbated symptoms, including tinnitus. Recommended for bone scan - pending?

## 2024-07-28 NOTE — DATA REVIEWED
[de-identified] : Right - profound, rising to a moderate essentially sensorineural hearing loss  Left - severe, rising to a mild essentially sensorineural hearing loss  Tymp - CNS - AU

## 2024-07-28 NOTE — PHYSICAL EXAM
[de-identified] : subtotal perf AS; inferior perf AD, dry [Midline] : trachea located in midline position [Normal] : orientation to person, place, and time: normal

## 2024-07-28 NOTE — PHYSICAL EXAM
[de-identified] : subtotal perf AS; inferior perf AD, dry [Midline] : trachea located in midline position [Normal] : orientation to person, place, and time: normal

## 2024-07-28 NOTE — DATA REVIEWED
[de-identified] : Right - profound, rising to a moderate essentially sensorineural hearing loss  Left - severe, rising to a mild essentially sensorineural hearing loss  Tymp - CNS - AU

## 2024-07-28 NOTE — HISTORY OF PRESENT ILLNESS
[de-identified] : 59 year old woman, 1 month follow up for [facility] - worsening at Right ear - s/p oral Prednisone taper. History of Meniere's disease - wears hearing aids.  Taking Betahistine as prescribed - states hearing is "all over the place" - fluctuates, sometimes better, sometimes worse.  Reports wearing mouth guard/retainer at night, which exacerbated symptoms, including tinnitus. Recommended for bone scan - pending?

## 2024-09-21 ENCOUNTER — TRANSCRIPTION ENCOUNTER (OUTPATIENT)
Age: 59
End: 2024-09-21

## 2024-10-01 ENCOUNTER — APPOINTMENT (OUTPATIENT)
Dept: NEPHROLOGY | Facility: CLINIC | Age: 59
End: 2024-10-01
Payer: COMMERCIAL

## 2024-10-01 VITALS — HEART RATE: 71 BPM | SYSTOLIC BLOOD PRESSURE: 93 MMHG | DIASTOLIC BLOOD PRESSURE: 65 MMHG

## 2024-10-01 VITALS — HEART RATE: 74 BPM | SYSTOLIC BLOOD PRESSURE: 146 MMHG | DIASTOLIC BLOOD PRESSURE: 86 MMHG

## 2024-10-01 DIAGNOSIS — E83.42 HYPOMAGNESEMIA: ICD-10-CM

## 2024-10-01 DIAGNOSIS — R03.0 ELEVATED BLOOD-PRESSURE READING, W/OUT DIAGNOSIS OF HYPERTENSION: ICD-10-CM

## 2024-10-01 DIAGNOSIS — E87.6 HYPOKALEMIA: ICD-10-CM

## 2024-10-01 DIAGNOSIS — Z79.899 OTHER LONG TERM (CURRENT) DRUG THERAPY: ICD-10-CM

## 2024-10-01 DIAGNOSIS — H81.03 MENIERE'S DISEASE, BILATERAL: ICD-10-CM

## 2024-10-01 PROCEDURE — 99214 OFFICE O/P EST MOD 30 MIN: CPT

## 2024-10-01 PROCEDURE — G2211 COMPLEX E/M VISIT ADD ON: CPT | Mod: NC

## 2024-10-01 NOTE — ASSESSMENT
[FreeTextEntry1] : # Hypok and hypomg. * Reduce K to 10 meq daily. * Recheck labs with PCP.  # Borderline HTN improved. No orthostatic symptoms. BP ok at home. * The patient's blood pressure was checked with the [employer] [id number] using the SPRINT trial protocol after sitting quietly in an empty room with arm supported, back supported, and feet on the floor for 5 minutes. The average of 3 readings were taken. * A counseling information sheet on blood pressure and staying healthy has been given (which they have been instructed to read). * The patient has been counseled to check their BP at home with an automatic arm cuff, write down the readings, and reach me directly on the phone immediately if they are persistently > 180 systolic or if SBP is less than 100 or if lightheadedness develops. They were counseled to bring in all blood pressure readings and medications next visit. * The patient has been counseled that regular office follow-up (at least every 4 - 6 for now)  is important for monitoring and for their health, and that it is their responsibility to make follow up appointments. * The patient also has been counseled that they must never stop or change any medications without discussing this with me (or another physician).   # Meniere's. * Cont torsemide.  # Elevated creatinine improved.  * Likeliest due to diuretics. * Recheck labs next visit.

## 2024-10-01 NOTE — HISTORY OF PRESENT ILLNESS
[FreeTextEntry1] : She makes wedding cakes.   * Requires torsemide for meniere's. She has been taking torsemide 10 mg daily. BP controlled.  No SOB with exertion. No CP. No lightheadedness. Compliant with medications. Just had coffee today. Feels well. BP had been 110s at home. * K, mg now normalized on supplement. K 4.8. * Creatinine stable at 1.03.    Previous history (07Jun24): * BP controlled. BP 110s at home. No SOB with exertion. No lightheaddedness.* On mg and K supplement. On torsemide 10. * Cr 1.15, Ca 10.6. Torsemide had decreased from 20 to 10.   Previous history (11Apr24): * BP controlled. BP 110s at home. No SOB with exertion. Rare slight lightheadedness on standing quickly. (Advised to stand slowly, stay well hydrated, and let me know if this worsens.) No CP. Compliant with medications. * On mg and K supplement. * Borderline elvated calcium (normal on calcium).   Previous history (09Nov23): On torsemide 10 mg bid which she requires for Meniere's. Increased 2 weeks ago. * BP controlled. BP > 100s at home. Rare slight lightheadedness on standing quickly. (Advised to stand slowly, stay well hydrated, and let me know if this worsens.) No CP/SOB. Compliant with medications. * On mg and K.  * Borderlin elevated calcium (normal on ionized).   Previous history (08Mar23): * On magnesium, k-dur, torsemide for meniere's and electrolyte abnormalities. * Received rituximab previously in 2020. * Labs reportedly normal in January. Labs checked by PCP. * BP controlled. No lightheadedness. No CP/SOB. Compliant with medications.   Previous history (28Feb22): * Meniere's disease improved on torsemide. No lightheadedness. * High normal ionized calcium. PTH 18. * Potasium increased to 4.2. On 20 meq qd. * Cr 0.9, K 4.3 on 1/6/23.   Previous history (27Jan22): Labs from 11/21 reviewed. K 3.7, creatinine 1.0, calcium 10.4, alb 5.1. Calcium was 11.8 on [id number] with Dr. Conti. She is on vitamin D but not calcium supplement.   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 This was switched to dyazide 75/50 due to hypkalemia.   She received rituxan in May 2021 and reportedly has low B cell count (0 percent CD20) and is still immunocompromised. Her rheumatologist has been unsuccessful in getting her Evusheld.   K supplemented with KCl 40 meq daily.   Borderline HTN, SBP 120s with Dr. Conti. No lightheadedness.

## 2024-12-13 ENCOUNTER — APPOINTMENT (OUTPATIENT)
Dept: PHARMACY | Facility: CLINIC | Age: 59
End: 2024-12-13
Payer: SELF-PAY

## 2024-12-13 ENCOUNTER — APPOINTMENT (OUTPATIENT)
Dept: OTOLARYNGOLOGY | Facility: CLINIC | Age: 59
End: 2024-12-13

## 2024-12-13 PROCEDURE — 99213 OFFICE O/P EST LOW 20 MIN: CPT

## 2024-12-13 PROCEDURE — 92567 TYMPANOMETRY: CPT

## 2024-12-13 PROCEDURE — V5266C: CUSTOM | Mod: LT

## 2024-12-13 PROCEDURE — 92557 COMPREHENSIVE HEARING TEST: CPT

## 2025-01-16 RX ORDER — AZELASTINE HYDROCHLORIDE 137 UG/1
0.1 SPRAY, METERED NASAL
Qty: 3 | Refills: 3 | Status: ACTIVE | COMMUNITY
Start: 2025-01-16 | End: 1900-01-01

## 2025-02-07 ENCOUNTER — APPOINTMENT (OUTPATIENT)
Dept: OTOLARYNGOLOGY | Facility: CLINIC | Age: 60
End: 2025-02-07

## 2025-02-07 PROCEDURE — 92567 TYMPANOMETRY: CPT

## 2025-02-07 PROCEDURE — 92557 COMPREHENSIVE HEARING TEST: CPT

## 2025-02-07 RX ORDER — PREDNISONE 10 MG/1
10 TABLET ORAL
Qty: 49 | Refills: 0 | Status: ACTIVE | COMMUNITY
Start: 2025-02-07 | End: 1900-01-01

## 2025-03-05 ENCOUNTER — APPOINTMENT (OUTPATIENT)
Dept: OTOLARYNGOLOGY | Facility: CLINIC | Age: 60
End: 2025-03-05

## 2025-03-05 DIAGNOSIS — H93.A9 PULSATILE TINNITUS, UNSPECIFIED EAR: ICD-10-CM

## 2025-03-05 DIAGNOSIS — H81.03 MENIERE'S DISEASE, BILATERAL: ICD-10-CM

## 2025-03-05 DIAGNOSIS — H91.21 SUDDEN IDIOPATHIC HEARING LOSS, RIGHT EAR: ICD-10-CM

## 2025-03-05 DIAGNOSIS — H83.8X3 OTHER SPECIFIED DISEASES OF INNER EAR, BILATERAL: ICD-10-CM

## 2025-03-05 PROCEDURE — 99214 OFFICE O/P EST MOD 30 MIN: CPT

## 2025-03-05 PROCEDURE — 92567 TYMPANOMETRY: CPT

## 2025-03-05 PROCEDURE — 92557 COMPREHENSIVE HEARING TEST: CPT

## 2025-03-25 ENCOUNTER — APPOINTMENT (OUTPATIENT)
Dept: NEPHROLOGY | Facility: CLINIC | Age: 60
End: 2025-03-25

## 2025-06-04 ENCOUNTER — NON-APPOINTMENT (OUTPATIENT)
Age: 60
End: 2025-06-04

## 2025-06-05 ENCOUNTER — APPOINTMENT (OUTPATIENT)
Dept: OTOLARYNGOLOGY | Facility: CLINIC | Age: 60
End: 2025-06-05

## 2025-06-05 DIAGNOSIS — H83.8X3 OTHER SPECIFIED DISEASES OF INNER EAR, BILATERAL: ICD-10-CM

## 2025-06-05 DIAGNOSIS — H81.03 MENIERE'S DISEASE, BILATERAL: ICD-10-CM

## 2025-06-05 PROCEDURE — 99213 OFFICE O/P EST LOW 20 MIN: CPT

## 2025-06-05 RX ORDER — OMEPRAZOLE 40 MG/1
40 CAPSULE, DELAYED RELEASE ORAL
Refills: 0 | Status: ACTIVE | COMMUNITY

## 2025-06-11 RX ORDER — DIAZEPAM 2 MG/1
2 TABLET ORAL
Qty: 20 | Refills: 0 | Status: ACTIVE | COMMUNITY
Start: 2025-06-11 | End: 1900-01-01

## 2025-06-12 ENCOUNTER — APPOINTMENT (OUTPATIENT)
Dept: NEPHROLOGY | Facility: CLINIC | Age: 60
End: 2025-06-12
Payer: COMMERCIAL

## 2025-06-12 PROCEDURE — 99214 OFFICE O/P EST MOD 30 MIN: CPT | Mod: 95

## 2025-06-20 ENCOUNTER — NON-APPOINTMENT (OUTPATIENT)
Age: 60
End: 2025-06-20

## 2025-06-30 ENCOUNTER — APPOINTMENT (OUTPATIENT)
Dept: OTOLARYNGOLOGY | Facility: CLINIC | Age: 60
End: 2025-06-30
Payer: COMMERCIAL

## 2025-06-30 VITALS
DIASTOLIC BLOOD PRESSURE: 79 MMHG | BODY MASS INDEX: 28.17 KG/M2 | HEIGHT: 64 IN | WEIGHT: 165 LBS | HEART RATE: 72 BPM | OXYGEN SATURATION: 99 % | SYSTOLIC BLOOD PRESSURE: 118 MMHG

## 2025-06-30 PROCEDURE — 92567 TYMPANOMETRY: CPT

## 2025-06-30 PROCEDURE — 69801 INCISE INNER EAR: CPT | Mod: RT

## 2025-06-30 PROCEDURE — 99214 OFFICE O/P EST MOD 30 MIN: CPT | Mod: 25

## 2025-06-30 PROCEDURE — 92557 COMPREHENSIVE HEARING TEST: CPT

## 2025-07-09 RX ORDER — METHYLPREDNISOLONE 4 MG/1
4 TABLET ORAL
Qty: 1 | Refills: 0 | Status: ACTIVE | COMMUNITY
Start: 2025-07-07 | End: 1900-01-01

## 2025-07-23 ENCOUNTER — APPOINTMENT (OUTPATIENT)
Dept: OTOLARYNGOLOGY | Facility: CLINIC | Age: 60
End: 2025-07-23
Payer: COMMERCIAL

## 2025-07-23 DIAGNOSIS — R42 DIZZINESS AND GIDDINESS: ICD-10-CM

## 2025-07-23 DIAGNOSIS — H81.03 MENIERE'S DISEASE, BILATERAL: ICD-10-CM

## 2025-07-23 DIAGNOSIS — H83.8X3 OTHER SPECIFIED DISEASES OF INNER EAR, BILATERAL: ICD-10-CM

## 2025-07-23 PROCEDURE — 92557 COMPREHENSIVE HEARING TEST: CPT

## 2025-07-23 PROCEDURE — 99214 OFFICE O/P EST MOD 30 MIN: CPT

## 2025-07-23 PROCEDURE — 92567 TYMPANOMETRY: CPT

## 2025-07-23 RX ORDER — METHYLPREDNISOLONE 4 MG/1
4 TABLET ORAL
Qty: 1 | Refills: 1 | Status: ACTIVE | COMMUNITY
Start: 2025-07-23 | End: 1900-01-01

## 2025-09-10 ENCOUNTER — APPOINTMENT (OUTPATIENT)
Dept: OTOLARYNGOLOGY | Facility: CLINIC | Age: 60
End: 2025-09-10
Payer: COMMERCIAL

## 2025-09-10 ENCOUNTER — APPOINTMENT (OUTPATIENT)
Dept: PHARMACY | Facility: CLINIC | Age: 60
End: 2025-09-10
Payer: SELF-PAY

## 2025-09-10 DIAGNOSIS — H83.8X3 OTHER SPECIFIED DISEASES OF INNER EAR, BILATERAL: ICD-10-CM

## 2025-09-10 DIAGNOSIS — H72.03 CENTRAL PERFORATION OF TYMPANIC MEMBRANE, BILATERAL: ICD-10-CM

## 2025-09-10 PROCEDURE — 92557 COMPREHENSIVE HEARING TEST: CPT

## 2025-09-10 PROCEDURE — 99213 OFFICE O/P EST LOW 20 MIN: CPT

## 2025-09-10 PROCEDURE — V5267D: CUSTOM

## 2025-09-10 PROCEDURE — 92567 TYMPANOMETRY: CPT

## 2025-09-10 PROCEDURE — V5267C: CUSTOM
